# Patient Record
Sex: FEMALE | Race: WHITE | NOT HISPANIC OR LATINO | Employment: OTHER
[De-identification: names, ages, dates, MRNs, and addresses within clinical notes are randomized per-mention and may not be internally consistent; named-entity substitution may affect disease eponyms.]

---

## 2017-06-10 ENCOUNTER — HEALTH MAINTENANCE LETTER (OUTPATIENT)
Age: 32
End: 2017-06-10

## 2017-11-16 ENCOUNTER — HOSPITAL ENCOUNTER (INPATIENT)
Facility: CLINIC | Age: 32
LOS: 1 days | Discharge: HOME | End: 2017-11-18
Attending: EMERGENCY MEDICINE | Admitting: INTERNAL MEDICINE
Payer: MEDICAID

## 2017-11-16 DIAGNOSIS — T50.901A ACCIDENTAL DRUG OVERDOSE, INITIAL ENCOUNTER: ICD-10-CM

## 2017-11-16 DIAGNOSIS — F41.1 GAD (GENERALIZED ANXIETY DISORDER): Primary | ICD-10-CM

## 2017-11-16 DIAGNOSIS — M62.82 NON-TRAUMATIC RHABDOMYOLYSIS: ICD-10-CM

## 2017-11-16 DIAGNOSIS — T43.611A ACCIDENTAL CAFFEINE OVERDOSE, INITIAL ENCOUNTER (H): ICD-10-CM

## 2017-11-16 LAB
ALBUMIN SERPL-MCNC: 4.2 G/DL (ref 3.4–5)
ALP SERPL-CCNC: 69 U/L (ref 40–150)
ALT SERPL W P-5'-P-CCNC: 21 U/L (ref 0–50)
AMPHETAMINES UR QL SCN: NEGATIVE
ANION GAP SERPL CALCULATED.3IONS-SCNC: 11 MMOL/L (ref 3–14)
APAP SERPL-MCNC: <2 MG/L (ref 10–20)
AST SERPL W P-5'-P-CCNC: 23 U/L (ref 0–45)
BARBITURATES UR QL: NEGATIVE
BASOPHILS # BLD AUTO: 0 10E9/L (ref 0–0.2)
BASOPHILS NFR BLD AUTO: 0.5 %
BENZODIAZ UR QL: NEGATIVE
BILIRUB SERPL-MCNC: 0.6 MG/DL (ref 0.2–1.3)
BUN SERPL-MCNC: 8 MG/DL (ref 7–30)
CALCIUM SERPL-MCNC: 8.8 MG/DL (ref 8.5–10.1)
CANNABINOIDS UR QL SCN: NEGATIVE
CHLORIDE SERPL-SCNC: 107 MMOL/L (ref 94–109)
CK SERPL-CCNC: 2031 U/L (ref 30–225)
CO2 SERPL-SCNC: 21 MMOL/L (ref 20–32)
COCAINE UR QL: NEGATIVE
CREAT SERPL-MCNC: 0.78 MG/DL (ref 0.52–1.04)
DIFFERENTIAL METHOD BLD: ABNORMAL
EOSINOPHIL # BLD AUTO: 0 10E9/L (ref 0–0.7)
EOSINOPHIL NFR BLD AUTO: 0.2 %
ERYTHROCYTE [DISTWIDTH] IN BLOOD BY AUTOMATED COUNT: 14.6 % (ref 10–15)
ETHANOL SERPL-MCNC: <0.01 G/DL
GFR SERPL CREATININE-BSD FRML MDRD: 86 ML/MIN/1.7M2
GLUCOSE BLDC GLUCOMTR-MCNC: 100 MG/DL (ref 70–99)
GLUCOSE SERPL-MCNC: 108 MG/DL (ref 70–99)
HCG SERPL QL: NEGATIVE
HCT VFR BLD AUTO: 34.7 % (ref 35–47)
HGB BLD-MCNC: 11.7 G/DL (ref 11.7–15.7)
IMM GRANULOCYTES # BLD: 0 10E9/L (ref 0–0.4)
IMM GRANULOCYTES NFR BLD: 0.2 %
INR PPP: 0.99 (ref 0.86–1.14)
LYMPHOCYTES # BLD AUTO: 1 10E9/L (ref 0.8–5.3)
LYMPHOCYTES NFR BLD AUTO: 16.3 %
MCH RBC QN AUTO: 29.7 PG (ref 26.5–33)
MCHC RBC AUTO-ENTMCNC: 33.7 G/DL (ref 31.5–36.5)
MCV RBC AUTO: 88 FL (ref 78–100)
MONOCYTES # BLD AUTO: 0.3 10E9/L (ref 0–1.3)
MONOCYTES NFR BLD AUTO: 4.8 %
NEUTROPHILS # BLD AUTO: 4.6 10E9/L (ref 1.6–8.3)
NEUTROPHILS NFR BLD AUTO: 78 %
NRBC # BLD AUTO: 0 10*3/UL
NRBC BLD AUTO-RTO: 0 /100
OPIATES UR QL SCN: NEGATIVE
PCP UR QL SCN: NEGATIVE
PLATELET # BLD AUTO: 375 10E9/L (ref 150–450)
POTASSIUM SERPL-SCNC: 3.3 MMOL/L (ref 3.4–5.3)
PROT SERPL-MCNC: 7.3 G/DL (ref 6.8–8.8)
RBC # BLD AUTO: 3.94 10E12/L (ref 3.8–5.2)
SALICYLATES SERPL-MCNC: 3 MG/DL
SODIUM SERPL-SCNC: 139 MMOL/L (ref 133–144)
TSH SERPL DL<=0.005 MIU/L-ACNC: 2.16 MU/L (ref 0.4–4)
WBC # BLD AUTO: 5.8 10E9/L (ref 4–11)

## 2017-11-16 PROCEDURE — 25000132 ZZH RX MED GY IP 250 OP 250 PS 637: Performed by: INTERNAL MEDICINE

## 2017-11-16 PROCEDURE — 96361 HYDRATE IV INFUSION ADD-ON: CPT

## 2017-11-16 PROCEDURE — 00000146 ZZHCL STATISTIC GLUCOSE BY METER IP

## 2017-11-16 PROCEDURE — 80329 ANALGESICS NON-OPIOID 1 OR 2: CPT | Performed by: EMERGENCY MEDICINE

## 2017-11-16 PROCEDURE — G0378 HOSPITAL OBSERVATION PER HR: HCPCS

## 2017-11-16 PROCEDURE — 80320 DRUG SCREEN QUANTALCOHOLS: CPT | Performed by: EMERGENCY MEDICINE

## 2017-11-16 PROCEDURE — 93005 ELECTROCARDIOGRAM TRACING: CPT

## 2017-11-16 PROCEDURE — 84703 CHORIONIC GONADOTROPIN ASSAY: CPT | Performed by: EMERGENCY MEDICINE

## 2017-11-16 PROCEDURE — 85610 PROTHROMBIN TIME: CPT | Performed by: EMERGENCY MEDICINE

## 2017-11-16 PROCEDURE — 25000128 H RX IP 250 OP 636: Performed by: EMERGENCY MEDICINE

## 2017-11-16 PROCEDURE — 80307 DRUG TEST PRSMV CHEM ANLYZR: CPT | Performed by: EMERGENCY MEDICINE

## 2017-11-16 PROCEDURE — 85025 COMPLETE CBC W/AUTO DIFF WBC: CPT | Performed by: EMERGENCY MEDICINE

## 2017-11-16 PROCEDURE — 99220 ZZC INITIAL OBSERVATION CARE,LEVL III: CPT | Performed by: INTERNAL MEDICINE

## 2017-11-16 PROCEDURE — 82550 ASSAY OF CK (CPK): CPT | Performed by: EMERGENCY MEDICINE

## 2017-11-16 PROCEDURE — 84443 ASSAY THYROID STIM HORMONE: CPT | Performed by: EMERGENCY MEDICINE

## 2017-11-16 PROCEDURE — 80053 COMPREHEN METABOLIC PANEL: CPT | Performed by: EMERGENCY MEDICINE

## 2017-11-16 PROCEDURE — 25000128 H RX IP 250 OP 636: Performed by: INTERNAL MEDICINE

## 2017-11-16 PROCEDURE — 99285 EMERGENCY DEPT VISIT HI MDM: CPT | Mod: 25

## 2017-11-16 PROCEDURE — 96374 THER/PROPH/DIAG INJ IV PUSH: CPT

## 2017-11-16 RX ORDER — POTASSIUM CHLORIDE 1.5 G/1.58G
20-40 POWDER, FOR SOLUTION ORAL
Status: DISCONTINUED | OUTPATIENT
Start: 2017-11-16 | End: 2017-11-18 | Stop reason: HOSPADM

## 2017-11-16 RX ORDER — ONDANSETRON 2 MG/ML
4 INJECTION INTRAMUSCULAR; INTRAVENOUS EVERY 6 HOURS PRN
Status: DISCONTINUED | OUTPATIENT
Start: 2017-11-16 | End: 2017-11-18 | Stop reason: HOSPADM

## 2017-11-16 RX ORDER — LORAZEPAM 2 MG/ML
1 INJECTION INTRAMUSCULAR ONCE
Status: COMPLETED | OUTPATIENT
Start: 2017-11-16 | End: 2017-11-16

## 2017-11-16 RX ORDER — NALOXONE HYDROCHLORIDE 0.4 MG/ML
.1-.4 INJECTION, SOLUTION INTRAMUSCULAR; INTRAVENOUS; SUBCUTANEOUS
Status: DISCONTINUED | OUTPATIENT
Start: 2017-11-16 | End: 2017-11-18 | Stop reason: HOSPADM

## 2017-11-16 RX ORDER — LIDOCAINE 40 MG/G
CREAM TOPICAL
Status: DISCONTINUED | OUTPATIENT
Start: 2017-11-16 | End: 2017-11-18 | Stop reason: HOSPADM

## 2017-11-16 RX ORDER — ERGOCALCIFEROL 1.25 MG/1
50000 CAPSULE, LIQUID FILLED ORAL
COMMUNITY
End: 2018-03-02

## 2017-11-16 RX ORDER — SODIUM CHLORIDE, SODIUM LACTATE, POTASSIUM CHLORIDE, CALCIUM CHLORIDE 600; 310; 30; 20 MG/100ML; MG/100ML; MG/100ML; MG/100ML
1000 INJECTION, SOLUTION INTRAVENOUS CONTINUOUS
Status: DISCONTINUED | OUTPATIENT
Start: 2017-11-16 | End: 2017-11-16

## 2017-11-16 RX ORDER — POTASSIUM CHLORIDE 1500 MG/1
20-40 TABLET, EXTENDED RELEASE ORAL
Status: DISCONTINUED | OUTPATIENT
Start: 2017-11-16 | End: 2017-11-18 | Stop reason: HOSPADM

## 2017-11-16 RX ORDER — POTASSIUM CHLORIDE 29.8 MG/ML
20 INJECTION INTRAVENOUS
Status: DISCONTINUED | OUTPATIENT
Start: 2017-11-16 | End: 2017-11-18 | Stop reason: HOSPADM

## 2017-11-16 RX ORDER — LORAZEPAM 0.5 MG/1
.5-1 TABLET ORAL EVERY 6 HOURS PRN
Status: DISCONTINUED | OUTPATIENT
Start: 2017-11-16 | End: 2017-11-18 | Stop reason: HOSPADM

## 2017-11-16 RX ORDER — ACETAMINOPHEN 325 MG/1
650 TABLET ORAL EVERY 4 HOURS PRN
Status: DISCONTINUED | OUTPATIENT
Start: 2017-11-16 | End: 2017-11-18 | Stop reason: HOSPADM

## 2017-11-16 RX ORDER — CYANOCOBALAMIN 1000 UG/ML
1 INJECTION, SOLUTION INTRAMUSCULAR; SUBCUTANEOUS
COMMUNITY
End: 2018-03-02

## 2017-11-16 RX ORDER — POLYETHYLENE GLYCOL 3350 17 G/17G
17 POWDER, FOR SOLUTION ORAL DAILY PRN
Status: DISCONTINUED | OUTPATIENT
Start: 2017-11-16 | End: 2017-11-18 | Stop reason: HOSPADM

## 2017-11-16 RX ORDER — ONDANSETRON 4 MG/1
4 TABLET, ORALLY DISINTEGRATING ORAL EVERY 6 HOURS PRN
Status: DISCONTINUED | OUTPATIENT
Start: 2017-11-16 | End: 2017-11-18 | Stop reason: HOSPADM

## 2017-11-16 RX ORDER — BUPROPION HYDROCHLORIDE 300 MG/1
300 TABLET ORAL EVERY MORNING
Status: ON HOLD | COMMUNITY
End: 2017-11-18

## 2017-11-16 RX ORDER — ACETAMINOPHEN 650 MG/1
650 SUPPOSITORY RECTAL EVERY 4 HOURS PRN
Status: DISCONTINUED | OUTPATIENT
Start: 2017-11-16 | End: 2017-11-18 | Stop reason: HOSPADM

## 2017-11-16 RX ORDER — SODIUM CHLORIDE 9 MG/ML
1000 INJECTION, SOLUTION INTRAVENOUS CONTINUOUS
Status: DISCONTINUED | OUTPATIENT
Start: 2017-11-16 | End: 2017-11-16

## 2017-11-16 RX ORDER — SODIUM CHLORIDE 9 MG/ML
INJECTION, SOLUTION INTRAVENOUS CONTINUOUS
Status: DISCONTINUED | OUTPATIENT
Start: 2017-11-16 | End: 2017-11-18 | Stop reason: HOSPADM

## 2017-11-16 RX ORDER — POTASSIUM CL/LIDO/0.9 % NACL 10MEQ/0.1L
10 INTRAVENOUS SOLUTION, PIGGYBACK (ML) INTRAVENOUS
Status: DISCONTINUED | OUTPATIENT
Start: 2017-11-16 | End: 2017-11-18 | Stop reason: HOSPADM

## 2017-11-16 RX ORDER — NITROGLYCERIN 0.4 MG/1
0.4 TABLET SUBLINGUAL EVERY 5 MIN PRN
Status: DISCONTINUED | OUTPATIENT
Start: 2017-11-16 | End: 2017-11-18 | Stop reason: HOSPADM

## 2017-11-16 RX ORDER — POTASSIUM CHLORIDE 7.45 MG/ML
10 INJECTION INTRAVENOUS
Status: DISCONTINUED | OUTPATIENT
Start: 2017-11-16 | End: 2017-11-18 | Stop reason: HOSPADM

## 2017-11-16 RX ORDER — LISINOPRIL 10 MG/1
10 TABLET ORAL DAILY
COMMUNITY
End: 2018-03-02

## 2017-11-16 RX ADMIN — POTASSIUM CHLORIDE 20 MEQ: 1500 TABLET, EXTENDED RELEASE ORAL at 21:17

## 2017-11-16 RX ADMIN — SODIUM CHLORIDE: 9 INJECTION, SOLUTION INTRAVENOUS at 18:56

## 2017-11-16 RX ADMIN — SODIUM CHLORIDE, POTASSIUM CHLORIDE, SODIUM LACTATE AND CALCIUM CHLORIDE 1000 ML: 600; 310; 30; 20 INJECTION, SOLUTION INTRAVENOUS at 15:55

## 2017-11-16 RX ADMIN — LORAZEPAM 1 MG: 2 INJECTION INTRAMUSCULAR; INTRAVENOUS at 15:10

## 2017-11-16 RX ADMIN — LORAZEPAM 1 MG: 0.5 TABLET ORAL at 19:51

## 2017-11-16 RX ADMIN — POTASSIUM CHLORIDE 40 MEQ: 1500 TABLET, EXTENDED RELEASE ORAL at 19:25

## 2017-11-16 RX ADMIN — SODIUM CHLORIDE, POTASSIUM CHLORIDE, SODIUM LACTATE AND CALCIUM CHLORIDE 1000 ML: 600; 310; 30; 20 INJECTION, SOLUTION INTRAVENOUS at 17:22

## 2017-11-16 RX ADMIN — SODIUM CHLORIDE 1000 ML: 9 INJECTION, SOLUTION INTRAVENOUS at 15:09

## 2017-11-16 ASSESSMENT — ENCOUNTER SYMPTOMS
CHEST TIGHTNESS: 1
NERVOUS/ANXIOUS: 1

## 2017-11-16 NOTE — ED NOTES
Assisted with Pt. Ambulance triage. Applied monitoring devices (EKG, BP, and pulse ox) onto Patient.

## 2017-11-16 NOTE — IP AVS SNAPSHOT
MRN:[id number]                      After Visit Summary   11/16/2017    Bhavya Estrada    MRN: [id number]           Thank you!     Thank you for choosing [facility] for your care. Our goal is always to provide you with excellent care. Hearing back from our patients is one way we can continue to improve our services. Please take a few minutes to complete the written survey that you may receive in the mail after you visit. If you would like to speak to someone directly about your visit please contact Patient Relations at [phone]. Thank you!          Patient Information     Date Of Birth          1985        Designated Caregiver       Most Recent Value    Caregiver    Will someone help with your care after discharge? yes    Name of designated caregiver Silvio Atkinson    Phone number of caregiver see chart     Caregiver address see chart       About your hospital stay     You were admitted on:  November 16, 2017 You last received care in the:  [address] [facility]    You were discharged on:  November 18, 2017        Reason for your hospital stay       You had developed significant side effects from excessive dosing of Bupropion (Wellbutrin).   I have given you a new antidepressant/antianxiety medication (Venlafaxine, Effexor) that you may try. Start with one pill in the morning and then increase to 1 twice a day with meals. Follow up with your doctor in about two weeks to discuss the new medication.                  Who to Call     For medical emergencies, please call [phone].  For non-urgent questions about your medical care, please call your primary care provider or clinic, [phone]          Attending Provider     Provider Specialty    Fernando Guevara MD Emergency Medicine    [facility]    Rasheed Christian MD Internal Medicine       Primary Care Provider Office Phone # Fax #    [facility] [phone]  "     After Care Instructions     Activity       Your activity upon discharge: activity as tolerated            Diet       Follow this diet upon discharge: Regular                  Follow-up Appointments     Follow-up and recommended labs and tests        Follow up with primary care provider, Kojo Carvajal, within 2 weeks for medication follow up.                  Pending Results     No orders found from 2017 to 2017.            Statement of Approval     Ordered          17 1355  I have reviewed and agree with all the recommendations and orders detailed in this document.  EFFECTIVE NOW     Approved and electronically signed by:  Rasheed Christian MD             Admission Information     Date & Time Provider Department Dept. Phone    2017 Rasheed Christian MD [place] [address] [facility] [phone]      Your Vitals Were     Blood Pressure Pulse Temperature Respirations Height Weight    126/96 (BP Location: Left arm) 106 98.1  F (36.7  C) (Oral) 16 1.651 m (5' 5\") 67.1 kg (148 lb)    Pulse Oximetry BMI (Body Mass Index)                100% 24.63 kg/m2          MyChart Information     resmio lets you send messages to your doctor, view your test results, renew your prescriptions, schedule appointments and more. To sign up, go to [email or web address] . Click on \"Log in\" on the left side of the screen, which will take you to the Welcome page. Then click on \"Sign up Now\" on the right side of the page.     You will be asked to enter the access code listed below, as well as some personal information. Please follow the directions to create your username and password.     Your access code is: [email or web address]  Expires: 2018  2:01 PM     Your access code will  in 90 days. If you need help or a new code, please call your [place] clinic or [phone].        Care EveryWhere ID     This is your Care EveryWhere ID. This could be used by other organizations to access your [place] " medical records  [id number]        Equal Access to Services     ANDREW VINCENT : Hadii aad ku hademigdiowilfrido Sochristianeali, waaxda luqadaha, qaybta kawilliamorin lujan, danielle tolliverchristophercarol padilla. So [facility] [phone].    ATENCIÓN: Si habla español, tiene a kitchen disposición servicios gratuitos de asistencia lingüística. Llame al [phone].    We comply with applicable federal civil rights laws and [place] laws. We do not discriminate on the basis of race, color, national origin, age, disability, sex, sexual orientation, or gender identity.               Review of your medicines      START taking        Dose / Directions    venlafaxine 37.5 MG tablet   Commonly known as:  EFFEXOR        Take 1 po daily x 4 days, then increase to 1 twice daily.   Quantity:  60 tablet   Refills:  1         CONTINUE these medicines which have NOT CHANGED        Dose / Directions    caffeine 200 MG Tabs tablet   Commonly known as:  NO-DOZE        Dose:  400-600 mg   Take 400-600 mg by mouth every morning Per pt, takes 2 to 3 tabs every morning   Refills:  0       cyanocobalamin 1000 MCG/ML injection   Commonly known as:  VITAMIN B12        Dose:  1 mL   Inject 1 mL into the muscle every 30 days   Refills:  0       lisinopril 10 MG tablet   Commonly known as:  PRINIVIL/ZESTRIL        Dose:  10 mg   Take 10 mg by mouth daily   Refills:  0       vitamin D 27946 UNIT capsule   Commonly known as:  ERGOCALCIFEROL        Dose:  26478 Units   Take 50,000 Units by mouth twice a week   Refills:  0         STOP taking     buPROPion 300 MG 24 hr tablet   Commonly known as:  WELLBUTRIN XL                Where to get your medicines      These medications were sent to [facility] [place], [place] - [address] [facility]  [address] [facility] [address]     Phone:  [phone]     venlafaxine 37.5 MG tablet                Protect others around you: Learn how to safely use, store and throw away your medicines at  www.disposemymeds.org.             Medication List: This is a list of all your medications and when to take them. Check marks below indicate your daily home schedule. Keep this list as a reference.      Medications           Morning Afternoon Evening Bedtime As Needed    caffeine 200 MG Tabs tablet   Commonly known as:  NO-DOZE   Take 400-600 mg by mouth every morning Per pt, takes 2 to 3 tabs every morning                                   cyanocobalamin 1000 MCG/ML injection   Commonly known as:  VITAMIN B12   Inject 1 mL into the muscle every 30 days                                lisinopril 10 MG tablet   Commonly known as:  PRINIVIL/ZESTRIL   Take 10 mg by mouth daily                                   venlafaxine 37.5 MG tablet   Commonly known as:  EFFEXOR   Take 1 po daily x 4 days, then increase to 1 twice daily.            One tablet Sunday, Monday, Tuesday, and Wednesday. Starting Thursday take one tablet twice a day.           Start at breakfast and supper on Wednesday.               vitamin D 98225 UNIT capsule   Commonly known as:  ERGOCALCIFEROL   Take 50,000 Units by mouth twice a week            Twice a week.                                 More Information        Patient Education    Venlafaxine Hydrochloride Oral capsule, extended-release    Venlafaxine Hydrochloride Oral tablet    Venlafaxine Hydrochloride Oral tablet, extended-release  Venlafaxine Hydrochloride Oral tablet  What is this medicine?  VENLAFAXINE (KARTIK la fax een) is used to treat depression, anxiety and panic disorder.  This medicine may be used for other purposes; ask your health care provider or pharmacist if you have questions.  What should I tell my health care provider before I take this medicine?  They need to know if you have any of these conditions:    bleeding disorders    glaucoma    heart disease    high blood pressure    high cholesterol    kidney disease    liver disease    low levels of sodium in the blood    whitney or  bipolar disorder    seizures    suicidal thoughts, plans, or attempt; a previous suicide attempt by you or a family    take medicines that treat or prevent blood clots    thyroid disease    an unusual or allergic reaction to venlafaxine, desvenlafaxine, other medicines, foods, dyes, or preservatives    pregnant or trying to get pregnant    breast-feeding  How should I use this medicine?  Take this medicine by mouth with a glass of water. Follow the directions on the prescription label. Take it with food. Take your medicine at regular intervals. Do not take your medicine more often than directed. Do not stop taking this medicine suddenly except upon the advice of your doctor. Stopping this medicine too quickly may cause serious side effects or your condition may worsen.  A special MedGuide will be given to you by the pharmacist with each prescription and refill. Be sure to read this information carefully each time.  Talk to your pediatrician regarding the use of this medicine in children. Special care may be needed.  Overdosage: If you think you have taken too much of this medicine contact a poison control center or emergency room at once.  NOTE: This medicine is only for you. Do not share this medicine with others.  What if I miss a dose?  If you miss a dose, take it as soon as you can. If it is almost time for your next dose, take only that dose. Do not take double or extra doses.  What may interact with this medicine?  Do not take this medicine with any of the following medications:    certain medicines for fungal infections like fluconazole, itraconazole, ketoconazole, posaconazole, voriconazole    cisapride    desvenlafaxine    dofetilide    dronedarone    duloxetine    levomilnacipran    linezolid    MAOIs like Carbex, Eldepryl, Marplan, Nardil, and Parnate    methylene blue (injected into a vein)    milnacipran    pimozide    thioridazine    ziprasidone  This medicine may also interact with the following  medications:    aspirin and aspirin-like medicines    certain medicines for depression, anxiety, or psychotic disturbances    certain medicines for migraine headaches like almotriptan, eletriptan, frovatriptan, naratriptan, rizatriptan, sumatriptan, zolmitriptan    certain medicines for sleep    certain medicines that treat or prevent blood clots like dalteparin, enoxaparin, warfarin    cimetidine    clozapine    diuretics    fentanyl    furazolidone    indinavir    isoniazid    lithium    metoprolol    NSAIDS, medicines for pain and inflammation, like ibuprofen or naproxen    other medicines that prolong the QT interval (cause an abnormal heart rhythm)    procarbazine    rasagiline    supplements like Lasker's wort, kava kava, valerian    tramadol    tryptophan  This list may not describe all possible interactions. Give your health care provider a list of all the medicines, herbs, non-prescription drugs, or dietary supplements you use. Also tell them if you smoke, drink alcohol, or use illegal drugs. Some items may interact with your medicine.  What should I watch for while using this medicine?  Tell your doctor if your symptoms do not get better or if they get worse. Visit your doctor or health care professional for regular checks on your progress. Because it may take several weeks to see the full effects of this medicine, it is important to continue your treatment as prescribed by your doctor.  Patients and their families should watch out for new or worsening thoughts of suicide or depression. Also watch out for sudden changes in feelings such as feeling anxious, agitated, panicky, irritable, hostile, aggressive, impulsive, severely restless, overly excited and hyperactive, or not being able to sleep. If this happens, especially at the beginning of treatment or after a change in dose, call your health care professional.  This medicine can cause an increase in blood pressure. Check with your doctor for  instructions on monitoring your blood pressure while taking this medicine.  You may get drowsy or dizzy. Do not drive, use machinery, or do anything that needs mental alertness until you know how this medicine affects you. Do not stand or sit up quickly, especially if you are an older patient. This reduces the risk of dizzy or fainting spells. Alcohol may interfere with the effect of this medicine. Avoid alcoholic drinks.  Your mouth may get dry. Chewing sugarless gum, sucking hard candy and drinking plenty of water will help. Contact your doctor if the problem does not go away or is severe.  What side effects may I notice from receiving this medicine?  Side effects that you should report to your doctor or health care professional as soon as possible:    allergic reactions like skin rash, itching or hives, swelling of the face, lips, or tongue    breathing problems    changes in vision    seizures    suicidal thoughts or other mood changes    trouble passing urine or change in the amount of urine    unusual bleeding or bruising  Side effects that usually do not require medical attention (report to your doctor or health care professional if they continue or are bothersome):    change in sex drive or performance    constipation    increased sweating    loss of appetite    nausea    tremors    weight loss  This list may not describe all possible side effects. Call your doctor for medical advice about side effects. You may report side effects to FDA at 3-329-FDA-5244.  Where should I keep my medicine?  Keep out of the reach of children.  Store at a controlled temperature between 20 and 25 degrees C (68 and 77 degrees F), in a dry place. Throw away any unused medicine after the expiration date.  NOTE:This sheet is a summary. It may not cover all possible information. If you have questions about this medicine, talk to your doctor, pharmacist, or health care provider. Copyright  2016 Gold Standard

## 2017-11-16 NOTE — ED NOTES
Bed: ED04  Expected date: 11/16/17  Expected time: 2:12 PM  Means of arrival: Ambulance  Comments:  Kojo Hargrove

## 2017-11-16 NOTE — ED NOTES
Patient ambulated to bathroom with minimal assistance. Gait steady, no complaints of dizziness, or being light headed.

## 2017-11-16 NOTE — ED NOTES
"[facility]  ED Nurse Handoff Report    Bhavya Estrada is a 32 year old female   ED Chief complaint: No chief complaint on file.  . ED Diagnosis:   Final diagnoses:   Accidental drug overdose, initial encounter   Accidental caffeine overdose, initial encounter   Non-traumatic rhabdomyolysis     Allergies: No Known Allergies    Code Status: Not on file  Activity level - Baseline/Home:  Independent. Activity Level - Current:   Stand with Assist. Lift room needed: No. Bariatric: No   Needed: No   Isolation: No. Infection: Not Applicable.     Vital Signs:   Vitals:    11/16/17 1615 11/16/17 1630 11/16/17 1700 11/16/17 1715   BP: 127/90 123/89 (!) 130/94 (!) 128/94   Pulse:       Resp: 13 15 13 12   Temp:       TempSrc:       SpO2: 97% 100% 100% 99%   Weight:           Cardiac Rhythm:  ,      Pain level: 0-10 Pain Scale: 0  Patient confused: No. Patient Falls Risk: Yes.   Elimination Status: Has voided   Patient Report - Initial Complaint: Overdose . Focused Assessment: Bhavya Estrada is a 32 year old female with a history of hypertension and anxiety, who presents to the emergency department today for evaluation of medication overdose. The patient states that she took 2 doses of her Wellbutrin XL (300 mg tablets) as well as 2 caffeine pills (200 mg tablets) today. She has been trying to self medicate for her anxiety of late by taking more doses of her Wellbutrin than she is prescribed. Her prescription was last filled on 11/10/17 and there are 17 tablets missing instead of what should only be 7 tablets missing. She is anxious here and describes something, like a \"numbness\", going up from her chest into her head and breathing deeply prevents \"it\" from doing so. She thinks she is going to pass out, but has not done so. She denies illicit drug use or alcohol use. Denies co-ingestion.  Tests Performed: Blood work  Abnormal Results:   Labs Ordered and Resulted from Time of ED Arrival Up to the Time " of Departure from the ED   CBC WITH PLATELETS DIFFERENTIAL - Abnormal; Notable for the following:        Result Value    Hematocrit 34.7 (*)     All other components within normal limits   COMPREHENSIVE METABOLIC PANEL - Abnormal; Notable for the following:     Potassium 3.3 (*)     Glucose 108 (*)     All other components within normal limits   CK TOTAL - Abnormal; Notable for the following:     CK Total 2031 (*)     All other components within normal limits   GLUCOSE BY METER - Abnormal; Notable for the following:     Glucose 100 (*)     All other components within normal limits   INR   SALICYLATE LEVEL   ACETAMINOPHEN LEVEL   ALCOHOL ETHYL   TSH   DRUG ABUSE SCREEN 77 URINE (FL, RH, SH)   HCG QUALITATIVE   GLUCOSE MONITOR NURSING POCT   PULSE OXIMETRY NURSING   CARDIAC CONTINUOUS MONITORING   PERIPHERAL IV CATHETER     Treatments provided: Fluids, Ativan   Family Comments: Significant Other in Room   OBS brochure/video discussed/provided to patient:  N/A  ED Medications:   Medications   0.9% sodium chloride BOLUS (0 mLs Intravenous Stopped 11/16/17 1716)     Followed by   0.9% sodium chloride infusion (not administered)   lactated ringers BOLUS 1,000 mL (1,000 mLs Intravenous New Bag 11/16/17 1722)   lactated ringers infusion (not administered)   LORazepam (ATIVAN) injection 1 mg (1 mg Intravenous Given 11/16/17 1510)   lactated ringers BOLUS 1,000 mL (0 mLs Intravenous Stopped 11/16/17 1716)     Drips infusing:  Yes  For the majority of the shift, the patient's behavior Green. Interventions performed were NA.     Severe Sepsis OR Septic Shock Diagnosis Present: No      ED Nurse Name/Phone Number: Luisa Vicki,   5:25 PM  RECEIVING UNIT ED HANDOFF REVIEW    Above ED Nurse Handoff Report was reviewed: Yes  Reviewed by: Noah Lee on November 16, 2017 at 6:07 PM

## 2017-11-16 NOTE — ED NOTES
"Pt much calmer since ativan.  Eyes open HR now 112.  States she knows it is \"just a way of feeling\" and that \"I watch too many Dr shows.\"  "

## 2017-11-16 NOTE — IP AVS SNAPSHOT
[place] [address] [facility]    [address]    [facility] [address]    Phone:  [phone]    Fax:  [phone]                                       After Visit Summary   11/16/2017    Bhavya Estrada    MRN: [id number]           After Visit Summary Signature Page     I have received my discharge instructions, and my questions have been answered. I have discussed any challenges I see with this plan with the nurse or doctor.    ..........................................................................................................................................  Patient/Patient Representative Signature      ..........................................................................................................................................  Patient Representative Print Name and Relationship to Patient    ..................................................               ................................................  Date                                            Time    ..........................................................................................................................................  Reviewed by Signature/Title    ...................................................              ..............................................  Date                                                            Time

## 2017-11-16 NOTE — ED PROVIDER NOTES
"  History     Chief Complaint:  Anxiety; Medication Overdose    HPI  History is limited secondary to agitation and anxiety  Bhavya Estrada is a 32 year old female with a history of hypertension and anxiety, who presents to the emergency department today for evaluation of medication overdose. The patient states that she took 2 doses of her Wellbutrin XL (300 mg tablets) as well as 2 caffeine pills (200 mg tablets) today. She has been trying to self medicate for her anxiety of late by taking more doses of her Wellbutrin than she is prescribed. Her prescription was last filled on 11/10/17 and there are 17 tablets missing instead of what should only be 7 tablets missing. She is anxious here and describes something, like a \"numbness\", going up from her chest into her head and breathing deeply prevents \"it\" from doing so. She thinks she is going to pass out, but has not done so. She denies illicit drug use or alcohol use. Denies co-ingestion.    Allergies:  No Known Drug Allergies      Medications:      BuPROPion HCl (WELLBUTRIN PO)    Past Medical History:    Anxiety   Depression     Past Surgical History:    History reviewed. No pertinent past surgical history.     Family History:    History reviewed. No pertinent family history.      Social History:  The patient was accompanied to the ED by EMS.  Smoking Status: unknown     Review of Systems   Respiratory: Positive for chest tightness.    Neurological: Negative for syncope.   Psychiatric/Behavioral: The patient is nervous/anxious.    All other systems reviewed and are negative.    Physical Exam     Patient Vitals for the past 24 hrs:   BP Temp Temp src Pulse Heart Rate Resp SpO2 Weight   11/16/17 1700 - - - - 105 13 100 % -   11/16/17 1600 127/86 - - - 105 - 100 % -   11/16/17 1545 (!) 133/95 - - - 113 - 99 % -   11/16/17 1515 (!) 139/101 - - 106 106 12 90 % -   11/16/17 1500 (!) 149/109 - - 125 125 20 100 % -   11/16/17 1445 - - - - - - 100 % -   11/16/17 1442 (!) " 173/99 98.7  F (37.1  C) Oral 137 - 22 100 % 68 kg (150 lb)      Physical Exam    General:   Age appropriate, agitated and ill appearing female.   HEENT:    Oropharynx is moist, without lesions or trismus.  Eyes:    Conjunctiva normal, PERRL     No nystagmus  Neck:    Supple, no meningismus.     CV:     Tachycardic, regular rhythm.      No murmurs, rubs or gallops.        2+ radial pulses bilateral.   PULM:    Clear to auscultation bilateral.       No respiratory distress.      Good air exchange.  ABD:    Soft, non-tender, non-distended.       No rebound, guarding or rigidity.  MSK:     No gross deformity to all four extremities.   LYMPH:   No cervical lymphadenopathy.  NEURO:   Alert;oriented x 3.     Intermittently following commands.      Unable to comply with CN testing     Speech is rapid and tangential     No clonus     2+ bilateral patellar reflexes     Down-going Babinski bilateral     Strength is grossly symmetric     Normal muscular tone, no tremor.     No rigidity  Skin:    Warm, dry and intact.    Psych:    Moderate agitation     Severely anxious     Hyperventilating    Emergency Department Course     ECG:  ECG taken at 1547, ECG read at 1555  Sinus tachycardia  Possible left atrial enlargement   Borderline ECG   Rate 106 bpm. TX interval 156. QRS duration 94. QT/QTc 368/488. P-R-T axes 54,47,53.     Laboratory:  Laboratory findings were communicated with the patient who voiced understanding of the findings.  Drug abuse screen 77 urine: all negative   CBC: WBC 5.8, HGB 11.7,   CMP: K 3.3 (L), Glucose 108 (H), o/w WNL (Creatinine 0.78)  INR: 0.99  Salicylate level: 3  Acetaminophen level: <2   Ethanol level: <0.01  TSH: 2.16  HCG Qualitative Blood: negative   CK Total: 2031 (HH)  Glucose: 100 (H)    Interventions:  1509 NS, 1 L, IV   1510 Lorazepam, 1 mg, IV injection  1555 LR 1 L IV  1722 LR 1,000 mL IV    Emergency Department Course:  Nursing notes and vitals reviewed.  1447 I entered the  room.  1450 I performed an exam of the patient as documented above.   IV was inserted and blood was drawn for laboratory testing, results above.  The patient provided a urine sample here in the emergency department. This was sent for laboratory testing, findings above.   EKG obtained in the ED, see results above.    The patient received the above intervention(s).    1643 I spoke with poison control regarding patient's presentation, findings, and plan of care.   1710 the patient was rechecked and she was updated on the results of her laboratory studies.  Patient alert & oriented x 3.  No agitation, confusion, clonus or hyper-reflexia.  1719 I spoke with Dr. Cowan of the hospitalist service regarding patient's presentation, findings, and plan of care.   I discussed the treatment plan with the patient. They expressed understanding of this plan and consented to admission. I discussed the patient with Dr. Cowan, who will admit the patient to a monitored bed for further evaluation and treatment.     Impression & Plan      Medical Decision Making:  Bhavya Estrada is a 32 year old female who presents to the emergency department today for evaluation of unintentional overdose of Wellbutrin taking up to 900 mg per day with 400-600 mg of caffeine. On presentation she is agitated, tangential, and anxious. There was no gross alteration in mentation. The initial concern was for serotonin syndrome, but there was no clonus, hyper-reflexia or hyperthermia. She had remarkable improvement with 1 dose of Lorazepam for which she is now back to baseline. She is no longer tachycardic, hypertensive, agitated, or anxious. CK is mildly elevated which is of undetermined significance, though it maybe related to increased muscular activity related to the stimulant use of Wellbutrin and caffeine. No reported history of seizure. It is unlikely this represents serotonin syndrome given her near complete resolution of her symptoms in the ED. I  spoke with poison control, who recommended 24 hour observation to evaluate for seizure. The patient will be transferred to a monitored bed for further evaluation and treatment.     Diagnosis:    ICD-10-CM    1. Accidental drug overdose, initial encounter T50.901A    2. Accidental caffeine overdose, initial encounter T43.611A    3. Non-traumatic rhabdomyolysis M62.82      Disposition:   The patient was admitted to the hospital for further evaluation and care.     CMS Diagnoses: None     Scribe Disclosure:  I, Chi Bryan, am serving as a scribe at 2:47 PM on 11/16/2017 to document services personally performed by Fernando Guevara MD, based on my observations and the provider's statements to me.   [facility] EMERGENCY DEPARTMENT       Fernando Guevara MD  11/16/17 8299

## 2017-11-16 NOTE — ED NOTES
States 2-8 200mg caffeine pills and 2 doses of wellbutrin today.  Has taken 17 out of 30 pills of her Wellbutrin over  The past 6 days self treating for anxiety and depression.  Pt is extremely anxious breathing rapidly.  States that if she stops concentrating on deep breathing she will die.  States that she is losing the ability to talk.  Pt remains alert and oriented with clear speech and no difficulty breathing, swallowing or handling secretions.

## 2017-11-17 PROBLEM — T43.291A: Status: ACTIVE | Noted: 2017-11-17

## 2017-11-17 LAB
ALBUMIN SERPL-MCNC: 3 G/DL (ref 3.4–5)
ALP SERPL-CCNC: 55 U/L (ref 40–150)
ALT SERPL W P-5'-P-CCNC: 23 U/L (ref 0–50)
ANION GAP SERPL CALCULATED.3IONS-SCNC: 5 MMOL/L (ref 3–14)
AST SERPL W P-5'-P-CCNC: 29 U/L (ref 0–45)
BILIRUB DIRECT SERPL-MCNC: 0.1 MG/DL (ref 0–0.2)
BILIRUB SERPL-MCNC: 0.5 MG/DL (ref 0.2–1.3)
BUN SERPL-MCNC: 4 MG/DL (ref 7–30)
CALCIUM SERPL-MCNC: 7.2 MG/DL (ref 8.5–10.1)
CHLORIDE SERPL-SCNC: 111 MMOL/L (ref 94–109)
CK SERPL-CCNC: 3128 U/L (ref 30–225)
CK SERPL-CCNC: 3347 U/L (ref 30–225)
CO2 SERPL-SCNC: 24 MMOL/L (ref 20–32)
CREAT SERPL-MCNC: 0.66 MG/DL (ref 0.52–1.04)
GFR SERPL CREATININE-BSD FRML MDRD: >90 ML/MIN/1.7M2
GLUCOSE SERPL-MCNC: 80 MG/DL (ref 70–99)
INTERPRETATION ECG - MUSE: NORMAL
POTASSIUM SERPL-SCNC: 3.9 MMOL/L (ref 3.4–5.3)
POTASSIUM SERPL-SCNC: 4.2 MMOL/L (ref 3.4–5.3)
PROT SERPL-MCNC: 5.5 G/DL (ref 6.8–8.8)
SODIUM SERPL-SCNC: 140 MMOL/L (ref 133–144)

## 2017-11-17 PROCEDURE — 25000128 H RX IP 250 OP 636: Performed by: INTERNAL MEDICINE

## 2017-11-17 PROCEDURE — 25000125 ZZHC RX 250: Performed by: INTERNAL MEDICINE

## 2017-11-17 PROCEDURE — 80048 BASIC METABOLIC PNL TOTAL CA: CPT | Performed by: INTERNAL MEDICINE

## 2017-11-17 PROCEDURE — 80076 HEPATIC FUNCTION PANEL: CPT | Performed by: INTERNAL MEDICINE

## 2017-11-17 PROCEDURE — 84132 ASSAY OF SERUM POTASSIUM: CPT | Performed by: INTERNAL MEDICINE

## 2017-11-17 PROCEDURE — 25000132 ZZH RX MED GY IP 250 OP 250 PS 637: Performed by: INTERNAL MEDICINE

## 2017-11-17 PROCEDURE — 82550 ASSAY OF CK (CPK): CPT | Performed by: INTERNAL MEDICINE

## 2017-11-17 PROCEDURE — 25000132 ZZH RX MED GY IP 250 OP 250 PS 637: Performed by: PSYCHIATRY & NEUROLOGY

## 2017-11-17 PROCEDURE — G0378 HOSPITAL OBSERVATION PER HR: HCPCS

## 2017-11-17 PROCEDURE — 99233 SBSQ HOSP IP/OBS HIGH 50: CPT | Performed by: INTERNAL MEDICINE

## 2017-11-17 PROCEDURE — 12000007 ZZH R&B INTERMEDIATE

## 2017-11-17 PROCEDURE — 36415 COLL VENOUS BLD VENIPUNCTURE: CPT | Performed by: INTERNAL MEDICINE

## 2017-11-17 PROCEDURE — 25000132 ZZH RX MED GY IP 250 OP 250 PS 637: Performed by: HOSPITALIST

## 2017-11-17 RX ORDER — HYDROXYZINE HYDROCHLORIDE 25 MG/1
25 TABLET, FILM COATED ORAL 3 TIMES DAILY PRN
Status: DISCONTINUED | OUTPATIENT
Start: 2017-11-17 | End: 2017-11-17

## 2017-11-17 RX ORDER — DIAZEPAM 5 MG
5 TABLET ORAL EVERY 6 HOURS PRN
Status: DISCONTINUED | OUTPATIENT
Start: 2017-11-17 | End: 2017-11-18 | Stop reason: HOSPADM

## 2017-11-17 RX ORDER — CAFFEINE 200 MG
400-600 TABLET ORAL EVERY MORNING
COMMUNITY

## 2017-11-17 RX ORDER — LORAZEPAM 2 MG/ML
0.5 INJECTION INTRAMUSCULAR EVERY 4 HOURS PRN
Status: DISCONTINUED | OUTPATIENT
Start: 2017-11-17 | End: 2017-11-18 | Stop reason: HOSPADM

## 2017-11-17 RX ORDER — PROMETHAZINE HYDROCHLORIDE 25 MG/ML
25 INJECTION, SOLUTION INTRAMUSCULAR; INTRAVENOUS EVERY 6 HOURS PRN
Status: DISCONTINUED | OUTPATIENT
Start: 2017-11-17 | End: 2017-11-18 | Stop reason: HOSPADM

## 2017-11-17 RX ORDER — MIRTAZAPINE 7.5 MG/1
7.5-15 TABLET, FILM COATED ORAL AT BEDTIME
Status: DISCONTINUED | OUTPATIENT
Start: 2017-11-17 | End: 2017-11-18 | Stop reason: HOSPADM

## 2017-11-17 RX ADMIN — LORAZEPAM 1 MG: 0.5 TABLET ORAL at 16:38

## 2017-11-17 RX ADMIN — SODIUM CHLORIDE: 9 INJECTION, SOLUTION INTRAVENOUS at 13:41

## 2017-11-17 RX ADMIN — SODIUM CHLORIDE 1000 ML: 9 INJECTION, SOLUTION INTRAVENOUS at 08:37

## 2017-11-17 RX ADMIN — SODIUM CHLORIDE: 9 INJECTION, SOLUTION INTRAVENOUS at 18:45

## 2017-11-17 RX ADMIN — MIRTAZAPINE 7.5 MG: 7.5 TABLET ORAL at 21:29

## 2017-11-17 RX ADMIN — LORAZEPAM 1 MG: 0.5 TABLET ORAL at 23:55

## 2017-11-17 RX ADMIN — LORAZEPAM 1 MG: 0.5 TABLET ORAL at 08:34

## 2017-11-17 RX ADMIN — ACETAMINOPHEN 650 MG: 325 TABLET, FILM COATED ORAL at 13:59

## 2017-11-17 RX ADMIN — SODIUM CHLORIDE: 9 INJECTION, SOLUTION INTRAVENOUS at 23:53

## 2017-11-17 RX ADMIN — DIAZEPAM 5 MG: 5 TABLET ORAL at 19:32

## 2017-11-17 RX ADMIN — ONDANSETRON 4 MG: 4 TABLET, ORALLY DISINTEGRATING ORAL at 13:59

## 2017-11-17 RX ADMIN — HYDROXYZINE HYDROCHLORIDE 25 MG: 25 TABLET, FILM COATED ORAL at 13:05

## 2017-11-17 RX ADMIN — ACETAMINOPHEN 650 MG: 325 TABLET, FILM COATED ORAL at 23:55

## 2017-11-17 RX ADMIN — LORAZEPAM 1 MG: 0.5 TABLET ORAL at 00:47

## 2017-11-17 RX ADMIN — SODIUM CHLORIDE: 9 INJECTION, SOLUTION INTRAVENOUS at 02:28

## 2017-11-17 ASSESSMENT — ACTIVITIES OF DAILY LIVING (ADL)
ADLS_ACUITY_SCORE: 12
ADLS_ACUITY_SCORE: 12

## 2017-11-17 NOTE — H&P
CHIEF COMPLAINT:  Accidental overdose.      HISTORY OF PRESENT ILLNESS:  Ms. Bhavya Estrada is a 32-year-old female with a history of depression and anxiety.  She presented to the hospital today for concerns about accidental overdose.  She states that she has been taking Wellbutrin for quite some time.  Her normal dose had been 300 mg a day; however, she decided to increase this dose of 600 mg per day several months ago for treatment of her anxiety.  She had run out of health insurance, and therefore was not seeing her primary provider.  She felt she needed extra assistance with treatment of her anxiety; therefore, decided to double the dose.  About 1 week ago, she refilled her Wellbutrin and decided at that time to increase the dose to 3 tablets a day or 900 mg daily.  She had been tolerating this dose.  However, today she reports that she accidentally took double this amount, or a total of 1800 mg today.  She also took several caffeine tablets, which she normally takes every day.  She adamantly denies any suicidal intent here.  She states that she just forgot she had taken her medications.  After she took the double dose, she began to feel some shortness of breath and tremors.  She also became more confused.  This prompted her appearance in the Emergency Department.      On arrival to the ER, vital signs included a blood pressure of 175/100 with a heart rate of 137, afebrile, saturation 100% on room air.  According to ER provider, the patient appeared quite anxious and was hyperventilating.  She seemed somewhat agitated.  Per ER provider, after she received a dose of Ativan, symptoms were much improved.      Provider called Poison Control who recommended that the patient be admitted to [facility] observation overnight.  There was initial concern about the possibility of serotonin syndrome, but given the patient's lack of rigidity and fevers and improvement with a single dose of Ativan, seems unlikely diagnosis here.       EKG was also done in the ER, which shows normal sinus rhythm with unremarkable QT interval.      PAST MEDICAL HISTORY:   1.  Depression.   2.  Anxiety.   3.  Anemia.   4.  Vitamin D deficiency.   5.  Gastric bypass surgery history.   6.  Polycystic ovarian syndrome.   7.  Gestational diabetes mellitus.      CURRENT MEDICATIONS:   1.  Wellbutrin as above.  As mentioned above, she has been increasing her dose without supervision from a primary provider.     2.  She also takes caffeine tablets every day.      ALLERGIES:  She was told not to take NSAID medications in light of her gastric bypass history.      FAMILY HISTORY:  Reviewed.  Nothing contributory to this admission.      SOCIAL HISTORY:  The patient has 2 small children at home.  She has had some increased social stressors of late including relationship stress which prompted her recent increase of Wellbutrin.  She drinks socially.  She is a smoker, smoking up to 4 cigarettes per day.  Denies any illicit drug use.      REVIEW OF SYSTEMS:  Please see HPI for details.  A comprehensive greater than 10-point review of systems otherwise negative aside from that detailed above.      PHYSICAL EXAMINATION:   VITAL SIGNS:  Blood pressure is currently 125/85 with a heart rate of 100, afebrile, saturation 99% on room air.   GENERAL:  The patient appears nontoxic, in no acute distress.  She appears awake, alert and oriented x3, perhaps slightly anxious, but not markedly so.  Significant other is at bedside.   HEENT:  Head is atraumatic.  Sclerae white.  Eyelids normal.  Conjunctivae normal.  Extraocular movements are intact.   NECK:  Supple.  No cervical or supraclavicular lymphadenopathy.   HEART:  Mildly tachycardic.  Rhythm is regular.  No significant murmurs.  No lower extremity edema.   LUNGS:  Clear to auscultation bilaterally.  No intercostal retractions.  No conversational dyspnea.   ABDOMEN:  Nontender, nondistended.  Soft.  No masses.  No organomegaly.  "  EXTREMITIES:  No edema.   SKIN:  Exam reveals no rash.  No jaundice.  Skin is dry to touch.   NEUROLOGIC:  Cranial nerves II-XII are intact.  Moves all extremities appropriately.  Sensation intact to light touch in the upper and lower extremities bilaterally.  The patient does not have any significant rigidity on neurologic exam.  She has a mild tremor with outstretched hands.   PSYCHIATRIC:  The patient is awake, alert and oriented x3.  Mood and affect were appropriate.      LABORATORY AND  IMAGING DATA:  Reviewed above in HPI.      IMPRESSION AND PLAN:  Ms. Estrada is a 32-year-old female with a history of anxiety and depression.  She presents to the hospital today with unintentional Wellbutrin overdose.      The patient has not had medical insurance recently, has not been in to see her primary provider.  She has a prescription for Wellbutrin.  She was at a dose of 300 mg per day and on her own decided to double this dose to 600 mg per day 2 months ago.  More recently, 1 week ago she decided to increase this dose to 900 mg per day, again without any physician input.  Today she states she accidentally took double this amount, or 1800 mg today, accidentally.      1.  Accidental Wellbutrin overdose.  No suicidality or self-harm intent here.   2.  Anxiety history.  She has been unable to see a primary providers or mental health professionals given lack of insurance coverage recently.  It sounds like she has had some increased social stressors including relationship stress lately.   3.  Active smoker.   4.  Tachycardia and agitation related to accidental overdose.   5.  Mild rhabdomyolysis, suspect related to overdose.      PLAN:   1.  Admit to observation.   2.  IV fluid hydration overnight.   3.  Repeat CK level, BMP and liver function tests in the morning.   4.  Psychiatry consultation to assist with anxiety and perhaps offer some alternative treatment options.  She says she has tried \"everything\" in the past for " her anxiety, and Wellbutrin seems to work the best, although obviously not managing her symptoms well, given her recent attempts to self-medicate and increase her dose.   5.  Would reiterate on discharge of the patient that the maximum dose of Wellbutrin should be 300 mg per day.  Perhaps Psychiatry can recommend an alternative medication for her to use, either replacing Wellbutrin or in addition to it.     6.  No evidence of serotonin syndrome currently.  We will use intermittent Ativan dosing overnight for anxiety issues.   7.  Anticipate discharge likely tomorrow.         ONEIDA CURTIS MD             D: 2017 18:07   T: 2017 18:45   MT:       Name:     DEMETRIO LIM   MRN:      [id number]        Account:      [id number]   :      1985           Admitted:     [id number]      Document: [id number]

## 2017-11-17 NOTE — PROGRESS NOTES
Patient's demographic sheet shows no insurance. Contacted financial counselor to follow up with patient. FC already is aware of pt; they will continue to follow for insurance needs.     Marion Green RN, BSN, CTS  [facility]  [phone]

## 2017-11-17 NOTE — PLAN OF CARE
"Problem: Overdose, Ingestion/Inhalants (Adult)  Goal: Signs and Symptoms of Listed Potential Problems Will be Absent, Minimized or Managed (Overdose, Ingestion/Inhalants)  Signs and symptoms of listed potential problems will be absent, minimized or managed by discharge/transition of care (reference Overdose, Ingestion/Inhalants (Adult) CPG).   Outcome: No Change  PRIMARY DIAGNOSIS: \"GENERIC\" NURSING  OUTPATIENT/OBSERVATION GOALS TO BE MET BEFORE DISCHARGE:  1. ADLs back to baseline: Yes    2. Activity and level of assistance: Ambulating independently, calls appropriately if needs assistance. Spouse in room.    3. Pain status: Pain free.     4. Return to near baseline physical activity: Yes    5.   Anxiety present. Given PO ativan.     Discharge Planner Nurse   Safe discharge environment identified: Yes  Barriers to discharge: Yes, Psych Consult today       Entered by: Pablito Mclain 11/17/2017 12:56 AM     Please review provider order for any additional goals.   Nurse to notify provider when observation goals have been met and patient is ready for discharge.    VSS. A/O x 4. Generalized weakness noted w/ activity. Denies SOB/CP/Pain. NS infusing @ 125 mL/hr.      "

## 2017-11-17 NOTE — PLAN OF CARE
"Problem: Overdose, Ingestion/Inhalants (Adult)  Goal: Signs and Symptoms of Listed Potential Problems Will be Absent, Minimized or Managed (Overdose, Ingestion/Inhalants)  Signs and symptoms of listed potential problems will be absent, minimized or managed by discharge/transition of care (reference Overdose, Ingestion/Inhalants (Adult) CPG).   Outcome: No Change  PRIMARY DIAGNOSIS: \"GENERIC\" NURSING  OUTPATIENT/OBSERVATION GOALS TO BE MET BEFORE DISCHARGE:  1. ADLs back to baseline: Yes    2. Activity and level of assistance: Ambulating independently, calls appropriately if needs assistance. Spouse in room.    3. Pain status: Pain free.     4. Return to near baseline physical activity: Yes       Discharge Planner Nurse   Safe discharge environment identified: Yes  Barriers to discharge: Yes, Psych Consult today       Entered by: Pablito Mclain 11/17/2017 5:02 AM     Please review provider order for any additional goals.   Nurse to notify provider when observation goals have been met and patient is ready for discharge.    VSS. A/O x 4. Generalized weakness noted w/ activity. Denies SOB/CP/Pain. NS infusing @ 125 mL/hr. Continue POC. D/C home after further evaluation/treatment.       "

## 2017-11-17 NOTE — PLAN OF CARE
Problem: Overdose, Ingestion/Inhalants (Adult)  Goal: Signs and Symptoms of Listed Potential Problems Will be Absent, Minimized or Managed (Overdose, Ingestion/Inhalants)  Signs and symptoms of listed potential problems will be absent, minimized or managed by discharge/transition of care (reference Overdose, Ingestion/Inhalants (Adult) CPG).   Outcome: Improving  Ativan and atarax for tremors. Some improvement. Vss. ivf bolus given and IVF rate increased d/t elevated CK level. Per poison control, sx may take greater than 24 hours to improve as pt was already on this medication prior. Vss. A&Ox4 up ind in room.

## 2017-11-17 NOTE — PHARMACY-ADMISSION MEDICATION HISTORY
Admission medication history interview status for this patient is complete. See [facility] admission navigator for allergy information, prior to admission medications and immunization status.     Medication history interview source(s):Patient  Medication history resources (including written lists, pill bottles, clinic record):[facility]  Primary pharmacy: [facility]    Changes made to PTA medication list:  Added: all  Deleted: none  Changed: Bupropion to XL    Additional medication history information:      1.  Pharmacy has filled Propranolol ER 60mg daily - however per pt: last time she took was month ago.     2.  Pt reports taking Caffeine 200mg tabs, 2 to 3 tabs every morning    Medication reconciliation/reorder completed by provider prior to medication history? No    Prior to Admission medications    Medication Sig Last Dose Taking? Auth Provider   caffeine (NO-DOZE) 200 MG TABS tablet Take 400-600 mg by mouth every morning Per pt, takes 2 to 3 tabs every morning  Yes Unknown, Entered By History   buPROPion (WELLBUTRIN XL) 300 MG 24 hr tablet Take 300 mg by mouth every morning 11/16/2017 at Unknown time Yes Unknown, Entered By History   lisinopril (PRINIVIL/ZESTRIL) 10 MG tablet Take 10 mg by mouth daily 11/14/2017 Yes Unknown, Entered By History   vitamin D (ERGOCALCIFEROL) 92848 UNIT capsule Take 50,000 Units by mouth twice a week per pt, Tue and Thurs Yes Unknown, Entered By History   cyanocobalamin (VITAMIN B12) 1000 MCG/ML injection Inject 1 mL into the muscle every 30 days  Yes Unknown, Entered By History

## 2017-11-17 NOTE — PLAN OF CARE
Problem: Patient Care Overview  Goal: Discharge Needs Assessment  Outcome: No Change  Dyspnea  Patient complains of no shortness of breath.  Symptoms include no cough. Symptoms began 1 intermittently ago, gradually improving since that time.  Patient denies chest pain, located anterior chest. Associated symptoms include dyspnea. Patient does not have had recent travel.  Weight has been stable.  Appetite has been unchanged. Symptoms are exacerbated by emotional stress. Symptoms are alleviated by Ativan.

## 2017-11-17 NOTE — PLAN OF CARE
Problem: Patient Care Overview  Goal: Discharge Needs Assessment  Outcome: No Change  Dyspnea  Patient complains of no shortness of breath.  Symptoms include tightness in chest. Symptoms began a few intermittently ago, gradually improving since that time.  Patient denies chest pain, located anterior chest. Associated symptoms include dyspnea. Patient does not have had recent travel.  Weight has been stable.  Appetite has been unaffected. Symptoms are exacerbated by Cough. Symptoms are alleviated by rest.       Up SBA, alert and oriented x 4. No c/o chest pain or difficulty breathing. C/o discomfort in chest with cough. Pt was anxious and has tremors, ativan given x 1 with pt reporting decrease in  Anxiety. [facility] Poison Control called for follow up, this nurse updated Poison Control pt status. Per poison control recommend 2 mg Ativan to control anxiety and tremors, IV fluid and K replaced. Pt currently on IVF, K replaced pending recheck and MD notified poison control recommendation of Ativan dose.

## 2017-11-17 NOTE — CONSULTS
See dictation.   Psychiatry   Initial Consultation  Patient with improved insight, no deliberate sib. Contracts for safety. CKs elevated but creatinine, lfts wnl. Education given re dosing of antideps and options. Plan: continue stress mgmt with their  couples counselor. Begin REmeron for sleep and anxiety symptoms. REsume wellbutrin 300mg sr at discharge. Vistaril prn for anxiety breakthrough. Scripts for all three at discharge. FU with PMD and psychiatry.  Denies CD issues. Call prn until discharge home.     Kristy Mace MD  11/17/2017

## 2017-11-18 VITALS
BODY MASS INDEX: 24.66 KG/M2 | HEIGHT: 65 IN | WEIGHT: 148 LBS | DIASTOLIC BLOOD PRESSURE: 96 MMHG | HEART RATE: 106 BPM | OXYGEN SATURATION: 100 % | SYSTOLIC BLOOD PRESSURE: 126 MMHG | RESPIRATION RATE: 16 BRPM | TEMPERATURE: 98.1 F

## 2017-11-18 PROBLEM — M62.82 NON-TRAUMATIC RHABDOMYOLYSIS: Status: ACTIVE | Noted: 2017-11-18

## 2017-11-18 PROBLEM — F41.1 GAD (GENERALIZED ANXIETY DISORDER): Status: ACTIVE | Noted: 2017-11-18

## 2017-11-18 LAB
ANION GAP SERPL CALCULATED.3IONS-SCNC: 6 MMOL/L (ref 3–14)
BUN SERPL-MCNC: 4 MG/DL (ref 7–30)
CALCIUM SERPL-MCNC: 7.4 MG/DL (ref 8.5–10.1)
CHLORIDE SERPL-SCNC: 111 MMOL/L (ref 94–109)
CK SERPL-CCNC: 2015 U/L (ref 30–225)
CO2 SERPL-SCNC: 24 MMOL/L (ref 20–32)
CREAT SERPL-MCNC: 0.58 MG/DL (ref 0.52–1.04)
GFR SERPL CREATININE-BSD FRML MDRD: >90 ML/MIN/1.7M2
GLUCOSE SERPL-MCNC: 90 MG/DL (ref 70–99)
POTASSIUM SERPL-SCNC: 3.9 MMOL/L (ref 3.4–5.3)
SODIUM SERPL-SCNC: 141 MMOL/L (ref 133–144)

## 2017-11-18 PROCEDURE — 80048 BASIC METABOLIC PNL TOTAL CA: CPT | Performed by: INTERNAL MEDICINE

## 2017-11-18 PROCEDURE — 25000128 H RX IP 250 OP 636: Performed by: INTERNAL MEDICINE

## 2017-11-18 PROCEDURE — 25000132 ZZH RX MED GY IP 250 OP 250 PS 637: Performed by: HOSPITALIST

## 2017-11-18 PROCEDURE — 90686 IIV4 VACC NO PRSV 0.5 ML IM: CPT | Performed by: INTERNAL MEDICINE

## 2017-11-18 PROCEDURE — 36415 COLL VENOUS BLD VENIPUNCTURE: CPT | Performed by: INTERNAL MEDICINE

## 2017-11-18 PROCEDURE — 99239 HOSP IP/OBS DSCHRG MGMT >30: CPT | Performed by: INTERNAL MEDICINE

## 2017-11-18 PROCEDURE — 82550 ASSAY OF CK (CPK): CPT | Performed by: INTERNAL MEDICINE

## 2017-11-18 RX ORDER — VENLAFAXINE 37.5 MG/1
TABLET ORAL
Qty: 60 TABLET | Refills: 1 | Status: SHIPPED | OUTPATIENT
Start: 2017-11-18 | End: 2018-03-02

## 2017-11-18 RX ADMIN — SODIUM CHLORIDE: 9 INJECTION, SOLUTION INTRAVENOUS at 10:09

## 2017-11-18 RX ADMIN — DIAZEPAM 5 MG: 5 TABLET ORAL at 08:37

## 2017-11-18 RX ADMIN — SODIUM CHLORIDE: 9 INJECTION, SOLUTION INTRAVENOUS at 04:50

## 2017-11-18 RX ADMIN — INFLUENZA A VIRUS A/MICHIGAN/45/2015 X-275 (H1N1) ANTIGEN (FORMALDEHYDE INACTIVATED), INFLUENZA A VIRUS A/HONG KONG/4801/2014 X-263B (H3N2) ANTIGEN (FORMALDEHYDE INACTIVATED), INFLUENZA B VIRUS B/PHUKET/3073/2013 ANTIGEN (FORMALDEHYDE INACTIVATED), AND INFLUENZA B VIRUS B/BRISBANE/60/2008 ANTIGEN (FORMALDEHYDE INACTIVATED) 0.5 ML: 15; 15; 15; 15 INJECTION, SUSPENSION INTRAMUSCULAR at 11:32

## 2017-11-18 ASSESSMENT — ACTIVITIES OF DAILY LIVING (ADL)
ADLS_ACUITY_SCORE: 12

## 2017-11-18 NOTE — PLAN OF CARE
Problem: Patient Care Overview  Goal: Plan of Care/Patient Progress Review  Outcome: No Change    Dx: Overdose   Hx: Anxiety, Anemia, Depression, Vitamin-D Deficiency, and gastric bypass surgery.  Tele: NSR  Assessment: VSS. A/O x 4. Denies SOB/CP. Episode of BLE cramping, relieved w/ PO tylenol. PRN Ativan given x1 for anxiousness. Independent. PIV infusing NS @ 200 mL/hr. Spouse @ bedside throughout shift.   Plan: Continue POC. D/C home possibly today after further evaluation/treatment.

## 2017-11-18 NOTE — DISCHARGE SUMMARY
[facility] Discharge Summary    Bhavya Estrada MRN# [id number]   Age: 32 year old YOB: 1985     Date of Admission:  11/16/2017  Date of Discharge::  11/18/2017  Admitting Physician:  Rasheed Christian MD  Discharge Physician:  Rasheed Christian MD     Home clinic: [facility]          Admission Diagnoses:   Accidental caffeine overdose, initial encounter [T43.611A]  Accidental drug overdose, initial encounter [T50.901A]  Non-traumatic rhabdomyolysis [M62.82]          Discharge Diagnosis:   Principal Problem:    Bupropion overdose, accidental or unintentional, initial encounter  Active Problems:    WIN (generalized anxiety disorder)    Non-traumatic rhabdomyolysis            Procedures:   none          Medications Prior to Admission:     Prescriptions Prior to Admission   Medication Sig Dispense Refill Last Dose     caffeine (NO-DOZE) 200 MG TABS tablet Take 400-600 mg by mouth every morning Per pt, takes 2 to 3 tabs every morning        lisinopril (PRINIVIL/ZESTRIL) 10 MG tablet Take 10 mg by mouth daily   11/14/2017     vitamin D (ERGOCALCIFEROL) 41673 UNIT capsule Take 50,000 Units by mouth twice a week   per pt, Tue and Thurs     cyanocobalamin (VITAMIN B12) 1000 MCG/ML injection Inject 1 mL into the muscle every 30 days        [DISCONTINUED] buPROPion (WELLBUTRIN XL) 300 MG 24 hr tablet Take 300 mg by mouth every morning   11/16/2017 at Unknown time             Discharge Medications:     Current Discharge Medication List      START taking these medications    Details   venlafaxine (EFFEXOR) 37.5 MG tablet Take 1 po daily x 4 days, then increase to 1 twice daily.  Qty: 60 tablet, Refills: 1    Associated Diagnoses: WIN (generalized anxiety disorder)         CONTINUE these medications which have NOT CHANGED    Details   caffeine (NO-DOZE) 200 MG TABS tablet Take 400-600 mg by mouth every morning Per pt, takes 2 to 3 tabs every morning      lisinopril (PRINIVIL/ZESTRIL) 10 MG tablet  "Take 10 mg by mouth daily      vitamin D (ERGOCALCIFEROL) 60859 UNIT capsule Take 50,000 Units by mouth twice a week      cyanocobalamin (VITAMIN B12) 1000 MCG/ML injection Inject 1 mL into the muscle every 30 days         STOP taking these medications       buPROPion (WELLBUTRIN XL) 300 MG 24 hr tablet Comments:   Reason for Stopping:                     Consultations:   Consultation during this admission received from Psychiatry.           Hospital Course:   Bhavya Estrada is a 32 year old female who came to attention on 11/16/2017 with severe anxiety after having tried to self-medicate with Bupropion. Her ingestion is well-documented in other notes, with pt having tripled her prescribed dose without consulting with her provider.      Ms. Estrada was admitted initially to observation and was incidentally noted to have significantly elevated CK. She complained of myalgias involving the axial and proximal muscles, but otherwise denied excessive exercising as well as injury.      Problem List:   1. Bupropion overdose with unintentional excess intake. The patient was conservatively managed. She had no dangerous rhythm disturbances noted while monitored on telemetry throughout her stay.  2. Depression and Anxiety that has not been adequately controlled on Bupropion. Dr. Mace's note also indicates that the patient has tried and failed various SSRI's. She did describe to me intolerable GERD sx with Sertraline, but had not been tried on other SSRIs as far as the patient is able to tell me. She does not feel that the Hydroxyzine is helpful and wants to avoid Mirtazapine due to the associated tendency to cause weight gain.  3. Rhabdomyolysis. Peak CK is 3347 on 11/17 after which I gave pt an NS flush with increased IV NS rate. CK trended down and was about 2000 on the date of discharge.    BP (!) 126/96 (BP Location: Left arm)  Pulse 106  Temp 98.1  F (36.7  C) (Oral)  Resp 16  Ht 1.651 m (5' 5\")  Wt 67.1 kg (148 lb) "  SpO2 100%  BMI 24.63 kg/m2  At the time of discharge, Ms. Estrada is alert and comfortable. Her  is at her side in the hospital. She is able to ambulate independently and reports eating satisfactorily.  Chest: CTA  COR: RRR without murmur  Abd: soft, NTND          Discharge Instructions and Follow-Up:   Discharge diet: Regular   Discharge activity: Activity as tolerated   Discharge follow-up: Follow up with primary care provider in the next 2 weeks to readdress the new medication.           Discharge Disposition:   Discharged to home      Attestation:  I have reviewed today's vital signs, notes, medications, labs and imaging.  Total time: 35 minutes    Rasheed Christian MD

## 2017-11-18 NOTE — PROGRESS NOTES
[facility]  Hospitalist Progress Note  Rasheed Christian MD 11/17/2017    Reason for Stay (Diagnosis): accidental overdose of Bupropion.         Assessment and Plan:      Summary of Stay: Bhavya Estrada is a 32 year old female who came to attention on 11/16/2017 with severe anxiety after having tried to self-medicate with Bupropion. Her ingestion is well-documented in other notes, with pt having trippled her prescribed dose without consulting with her provider.     Ms. Estrada was admitted last night to observation and was incidentally noted to have significantly elevated CK. She complains of myalgias involving the axial and proximal muscles, but otherwise denies excessive exercising and injury.     Problem List:   1. Bupropion overdose with unintentional excess intake.   2. Depression and Anxiety that has not been adequately controlled on Bupropion. Dr. Mace's note also indicates that the patient has tried and failed various SSRI's. She did describe intolerable GERD sx with Sertraline. She does not feel that the Hydroxyzine is helpful.  3. Rhabdomyolysis. Peak CK is 3347 this am after which I gave pt an NS flush with increased IV NS rate. CK is trending down at this time.     PLAN:  1. Cont with IVF as noted. Recheck BMP and CK in am.   2. Consider trial of Venlafaxine upon discharge, rather than resumption of Bupropion.   3. Mirtazapine as recommended by Dr. Mace, at HS for sleep.   4. OK to try Lorazepam and promethazine for nausea overnight.     DVT Prophylaxis: Ambulate every shift  Code Status: Full Code  Discharge Dispo: home  Estimated Disch Date / # of Days until Disch: likely tomorrow, pending decrease in CK and on-going stability regarding withdrawal of Bupropion.    I called [facility] and discussed switch to Inpatient Status today, based on the newly identified Rhabdomyolysis.         Interval History (Subjective):      Chart reviewed, pt interviewed.      As noted above, pt has been on Sertraline  "in the past. Dr. Mace probably got a more complete psych history than I did, but can confirm with pt tomorrow.  Thuan SOB or cough. Very achey in muscles.   Vomited up Hydroxyzine this afternoon.   Urinating briskly.                   Physical Exam:      Last Vital Signs:  /84 (BP Location: Left arm)  Pulse 106  Temp 98  F (36.7  C) (Oral)  Resp 18  Ht 1.651 m (5' 5\")  Wt 67.1 kg (148 lb)  SpO2 99%  BMI 24.63 kg/m2    I/O last 3 completed shifts:  In: 2929 [I.V.:2929]  Out: -     Constitutional: Awake, alert, cooperative, no apparent distress   Respiratory: Clear to auscultation bilaterally, no crackles or wheezing   Cardiovascular: Regular rate and rhythm, normal S1 and S2, and no murmur noted   Abdomen: Normal bowel sounds, soft, non-distended, non-tender   Skin: No rashes, no cyanosis, dry to touch   Neuro: Alert and oriented x3, no weakness, numbness, memory loss   Extremities: No edema, normal range of motion   Other(s):        All other systems: Negative          Medications:      All current medications were reviewed with changes reflected in problem list.         Data:      All new lab and imaging data was reviewed.   Labs/Imaging:  Results for orders placed or performed during the hospital encounter of 11/16/17 (from the past 24 hour(s))   Potassium   Result Value Ref Range    Potassium 3.9 3.4 - 5.3 mmol/L   CK total   Result Value Ref Range    CK Total 3347 (HH) 30 - 225 U/L   Basic metabolic panel   Result Value Ref Range    Sodium 140 133 - 144 mmol/L    Potassium 4.2 3.4 - 5.3 mmol/L    Chloride 111 (H) 94 - 109 mmol/L    Carbon Dioxide 24 20 - 32 mmol/L    Anion Gap 5 3 - 14 mmol/L    Glucose 80 70 - 99 mg/dL    Urea Nitrogen 4 (L) 7 - 30 mg/dL    Creatinine 0.66 0.52 - 1.04 mg/dL    GFR Estimate >90 >60 mL/min/1.7m2    GFR Estimate If Black >90 >60 mL/min/1.7m2    Calcium 7.2 (L) 8.5 - 10.1 mg/dL   Hepatic panel   Result Value Ref Range    Bilirubin Direct 0.1 0.0 - 0.2 mg/dL    Bilirubin " Total 0.5 0.2 - 1.3 mg/dL    Albumin 3.0 (L) 3.4 - 5.0 g/dL    Protein Total 5.5 (L) 6.8 - 8.8 g/dL    Alkaline Phosphatase 55 40 - 150 U/L    ALT 23 0 - 50 U/L    AST 29 0 - 45 U/L   CK total   Result Value Ref Range    CK Total 3128 () 30 - 225 U/L

## 2017-11-18 NOTE — CONSULTS
PSYCHIATRIC CONSULTATION         REQUESTING PHYSICIAN:  Butch Cowan MD      REASON FOR CONSULTATION:  The patient Bhavya Estrada is a 32-year-old   female who is seen for psychiatric evaluation at the request of Dr. Cowan after she was admitted following a Wellbutrin over-ingestion.      HISTORY OF PRESENT ILLNESS:  The patient presented to the Emergency Department because of increased anxiety and stressors for the past several weeks.  She had increased her Wellbutrin on her own to 600 mg and then several days later to 900 mg daily and felt this had improved her symptoms.  She had accidentally taken the increased dose twice on the day of admission for a total of 1,800 mg.  She also takes caffeine tablets.  She had experienced shortness of breath and tremor.  Drug screen was negative for drugs of abuse.  Blood pressure in the Emergency Department was elevated at 175/100 with a heart rate of 137 and CK elevated to the 2,000-3,000 range.        The patient was admitted to the General Medical Floor and is feeling much better with IV hydration.  Wellbutrin has been held.  She eats small meals due to a gastric bypass.  She states she is compliant with medical care and denies ever having overdosed before.  She states regret over her actions and adamantly denies suicidal ideation.  She denies illicit drug use.      PAST PSYCHIATRIC HISTORY:  No suicide attempts, manic or psychotic episodes, denies chemical dependency.  She and her  have started couples counseling after going through a stressful period 4-6 weeks ago which she does not wish to discuss.  She has been on multiple SSRIs and BuSpar which were not helpful.  Vistaril was somewhat helpful.      PAST MEDICAL HISTORY:     1.  Polycystic ovarian syndrome.   2.  Gastric bypass surgery.   3.  Gestational diabetes.   4.  Vitamin D deficiency.   No history of closed head trauma or seizure.        ALLERGIES:  No known drug allergies, but avoids  "NSAIDs due to gastric bypass.      FAMILY HISTORY:  The patient denies mental illness, chemical dependency or suicide in the family.      SOCIAL HISTORY:  The patient has 3 young children, one of whom is approximately 1 year old.  She is a stay-at-home mom.  She is nicotine dependent.  She is using Wellbutrin for aenxiety.      REVIEW OF SYSTEMS:  No current chest pain, shortness of breath or lightheadedness.      EXAMINATION:   VITAL SIGNS:  Afebrile, blood pressure 175/85, pulse 100.   MENTAL STATUS EXAMINATION:  The patient is a well-nourished brunette who does appear her stated age.  She is pleasant and polite.  She is not in a delusional state with no agitation or paranoia.  Thought content is negative for hallucinations, suicidal or homicidal ideation.  She has reasonable comprehension and fund of knowledge and likely has average range of intelligence.  There is no memory disturbance or dissociation.  Gait is not assessed as she has an IV in place.  There is no tic or tremor noted.  Mood is described as \"good\" with recent anxiety.      PSYCHIATRIC DIAGNOSES:   1.  Depressive disorder.   2.  Generalized anxiety disorder   3.  Partner relational problem.   4.  Rule out caffeine abuse.      PLAN:  The patient and I discussed treatment options.  Given her current sleeplessness and general anxiety I recommended Remeron 7.5 to take 50 mg each day at bedtime.  She may resume Wellbutrin, but no more than 300 mg daily in the 48 hours after discharge.  She is not holdable.  There is no evidence of deliberate suicide attempt, and she is cassidy for safety.        Please call with questions and/or concerns.  I feel the patient is ready for discharge.         PAPO YOUNG MD             D: 2017 17:00   T: 2017 18:18   MT: [id number]      Name:     DEMETRIO LIM   MRN:      [id number]        Account:       [id number]   :      1985           Consult Date:  2017      Document: [id number]  "      [facility]

## 2018-03-02 ENCOUNTER — APPOINTMENT (OUTPATIENT)
Dept: GENERAL RADIOLOGY | Facility: CLINIC | Age: 33
End: 2018-03-02
Attending: EMERGENCY MEDICINE
Payer: COMMERCIAL

## 2018-03-02 ENCOUNTER — HOSPITAL ENCOUNTER (EMERGENCY)
Facility: CLINIC | Age: 33
Discharge: HOME | End: 2018-03-02
Attending: EMERGENCY MEDICINE | Admitting: EMERGENCY MEDICINE
Payer: COMMERCIAL

## 2018-03-02 VITALS
OXYGEN SATURATION: 99 % | WEIGHT: 127 LBS | TEMPERATURE: 98.9 F | BODY MASS INDEX: 21.13 KG/M2 | SYSTOLIC BLOOD PRESSURE: 117 MMHG | RESPIRATION RATE: 18 BRPM | DIASTOLIC BLOOD PRESSURE: 89 MMHG

## 2018-03-02 DIAGNOSIS — R10.13 ABDOMINAL PAIN, EPIGASTRIC: ICD-10-CM

## 2018-03-02 LAB
ALBUMIN SERPL-MCNC: 3.3 G/DL (ref 3.4–5)
ALP SERPL-CCNC: 62 U/L (ref 40–150)
ALT SERPL W P-5'-P-CCNC: 18 U/L (ref 0–50)
ANION GAP SERPL CALCULATED.3IONS-SCNC: 6 MMOL/L (ref 3–14)
AST SERPL W P-5'-P-CCNC: 14 U/L (ref 0–45)
BASOPHILS # BLD AUTO: 0 10E9/L (ref 0–0.2)
BASOPHILS NFR BLD AUTO: 0.5 %
BILIRUB SERPL-MCNC: 0.4 MG/DL (ref 0.2–1.3)
BUN SERPL-MCNC: 13 MG/DL (ref 7–30)
CALCIUM SERPL-MCNC: 8 MG/DL (ref 8.5–10.1)
CHLORIDE SERPL-SCNC: 107 MMOL/L (ref 94–109)
CO2 SERPL-SCNC: 26 MMOL/L (ref 20–32)
CREAT SERPL-MCNC: 0.58 MG/DL (ref 0.52–1.04)
DIFFERENTIAL METHOD BLD: ABNORMAL
EOSINOPHIL # BLD AUTO: 0.1 10E9/L (ref 0–0.7)
EOSINOPHIL NFR BLD AUTO: 1.2 %
ERYTHROCYTE [DISTWIDTH] IN BLOOD BY AUTOMATED COUNT: 17.2 % (ref 10–15)
GFR SERPL CREATININE-BSD FRML MDRD: >90 ML/MIN/1.7M2
GLUCOSE SERPL-MCNC: 86 MG/DL (ref 70–99)
HCT VFR BLD AUTO: 31 % (ref 35–47)
HGB BLD-MCNC: 10 G/DL (ref 11.7–15.7)
IMM GRANULOCYTES # BLD: 0 10E9/L (ref 0–0.4)
IMM GRANULOCYTES NFR BLD: 0.3 %
LIPASE SERPL-CCNC: 176 U/L (ref 73–393)
LYMPHOCYTES # BLD AUTO: 1.4 10E9/L (ref 0.8–5.3)
LYMPHOCYTES NFR BLD AUTO: 23.5 %
MCH RBC QN AUTO: 28.9 PG (ref 26.5–33)
MCHC RBC AUTO-ENTMCNC: 32.3 G/DL (ref 31.5–36.5)
MCV RBC AUTO: 90 FL (ref 78–100)
MONOCYTES # BLD AUTO: 0.4 10E9/L (ref 0–1.3)
MONOCYTES NFR BLD AUTO: 6.5 %
NEUTROPHILS # BLD AUTO: 4 10E9/L (ref 1.6–8.3)
NEUTROPHILS NFR BLD AUTO: 68 %
NRBC # BLD AUTO: 0 10*3/UL
NRBC BLD AUTO-RTO: 0 /100
PLATELET # BLD AUTO: 276 10E9/L (ref 150–450)
POTASSIUM SERPL-SCNC: 3.7 MMOL/L (ref 3.4–5.3)
PROT SERPL-MCNC: 6.1 G/DL (ref 6.8–8.8)
RBC # BLD AUTO: 3.46 10E12/L (ref 3.8–5.2)
SODIUM SERPL-SCNC: 139 MMOL/L (ref 133–144)
WBC # BLD AUTO: 5.8 10E9/L (ref 4–11)

## 2018-03-02 PROCEDURE — 25000132 ZZH RX MED GY IP 250 OP 250 PS 637: Performed by: EMERGENCY MEDICINE

## 2018-03-02 PROCEDURE — 99284 EMERGENCY DEPT VISIT MOD MDM: CPT

## 2018-03-02 PROCEDURE — 80053 COMPREHEN METABOLIC PANEL: CPT | Performed by: EMERGENCY MEDICINE

## 2018-03-02 PROCEDURE — 83690 ASSAY OF LIPASE: CPT | Performed by: EMERGENCY MEDICINE

## 2018-03-02 PROCEDURE — 25000125 ZZHC RX 250: Performed by: EMERGENCY MEDICINE

## 2018-03-02 PROCEDURE — 74019 RADEX ABDOMEN 2 VIEWS: CPT

## 2018-03-02 PROCEDURE — 85025 COMPLETE CBC W/AUTO DIFF WBC: CPT | Performed by: EMERGENCY MEDICINE

## 2018-03-02 PROCEDURE — 25000128 H RX IP 250 OP 636: Performed by: EMERGENCY MEDICINE

## 2018-03-02 PROCEDURE — 96374 THER/PROPH/DIAG INJ IV PUSH: CPT

## 2018-03-02 RX ORDER — SUCRALFATE ORAL 1 G/10ML
1 SUSPENSION ORAL 4 TIMES DAILY PRN
Qty: 210 ML | Refills: 0 | Status: SHIPPED | OUTPATIENT
Start: 2018-03-02

## 2018-03-02 RX ORDER — ONDANSETRON 2 MG/ML
4 INJECTION INTRAMUSCULAR; INTRAVENOUS ONCE
Status: COMPLETED | OUTPATIENT
Start: 2018-03-02 | End: 2018-03-02

## 2018-03-02 RX ORDER — ONDANSETRON 4 MG/1
4 TABLET, ORALLY DISINTEGRATING ORAL EVERY 8 HOURS PRN
Qty: 10 TABLET | Refills: 0 | Status: SHIPPED | OUTPATIENT
Start: 2018-03-02 | End: 2018-03-05

## 2018-03-02 RX ADMIN — LIDOCAINE HYDROCHLORIDE 30 ML: 20 SOLUTION ORAL; TOPICAL at 16:54

## 2018-03-02 RX ADMIN — ONDANSETRON 4 MG: 2 INJECTION INTRAMUSCULAR; INTRAVENOUS at 16:54

## 2018-03-02 ASSESSMENT — ENCOUNTER SYMPTOMS
DIARRHEA: 0
SHORTNESS OF BREATH: 0
FEVER: 0
COUGH: 0
CHILLS: 0
ABDOMINAL PAIN: 1
VOMITING: 1
CONSTIPATION: 0
NAUSEA: 1

## 2018-03-02 NOTE — ED NOTES
"Patient presents to ED with c/o heartburn, CP, vomiting and states she's lost 21 pounds in the last 3 weeks because she has not been able to eat or drink. Pt had gastric bypass in 2006. Feels like something is \"stuck\" in throat.   "

## 2018-03-02 NOTE — ED PROVIDER NOTES
History     Chief Complaint:  Chest Pain and Vomiting    HPI   Bhavya Estrada is a 32 year old female who presents with nausea, vomiting, and chest pain. The patient has a history notable for gastric bypass in 2006 as well as cholecystectomy 5 years ago. The patient states that she has chronic constipation at baseline and is on daily laxatives but otherwise has not had many complications apart from long standing GERD. Approximately 3 weeks ago the patient states she began feeling nauseous and had a lack of appetite. She was citing stress to her symptoms but notes that this has berto persistent since that time. Over the past 3 days she has since developed a burning chest pain described as heart burn but now vomiting as well with any oral intake. She describes a sensation of something getting stuck in her throat, causing her to vomit the food back up. She has been able to infrequently tolerate water but at times she vomited this up as well. Today, she likewise was unable to tolerate any oral intake prompting her visit here for evaluation. She describes ongoing burning pain. She has tried Tums and Prevacid without relief. She otherwise last had a bowel movement two days ago which is not atypical for her. She denies any fevers, chills, cough, shortness of breath, diarrhea, or urinary symptoms.    Allergies:  Aspirin  Chantix [Varenicline]  Ibuprofen Sodium    Medications:    Tylenol   TUMS  Prevacid    Past Medical History:    WIN  Rhabdomyolysis  Bupropion overdose  Metabolic syndrome X  GERD  PCOS  Anemia  Anxiety  Depressive disorder  Diabetes    Past Surgical History:    Gastric bypass - Dr. Rudolph Alvarado in 2006  Tonsillectomy  Gyn surgery  Hernia repair   Cholecystectomy  Intestinal anastomosis surgery     Family History:    Neurologic disorder     Social History:  Smoking Status: Current Smoker  Alcohol Use: Yes, casually  Patient presents alone.     Review of Systems   Constitutional: Negative for  chills and fever.   Respiratory: Negative for cough and shortness of breath.    Cardiovascular: Positive for chest pain.   Gastrointestinal: Positive for abdominal pain, nausea and vomiting. Negative for constipation and diarrhea.   All other systems reviewed and are negative.      Physical Exam   Patient Vitals for the past 24 hrs:   BP Temp Temp src Heart Rate Resp SpO2 Weight   03/02/18 1726 - - - - - 100 % -   03/02/18 1725 117/89 - - - - - -   03/02/18 1543 (!) 127/97 98.9  F (37.2  C) Oral 92 18 97 % 57.6 kg (127 lb)      Physical Exam  Nursing note and vitals reviewed.  Constitutional: Cooperative.   HENT:   Mouth/Throat: Moist mucous membranes.   Eyes: EOMI, nonicteric sclera  Cardiovascular: Normal rate, regular rhythm, no murmurs, rubs, or gallops  Pulmonary/Chest: Effort normal and breath sounds normal. No respiratory distress. No wheezes. No rales.   Abdominal: Soft. Mild epigastric TTP, nondistended, no guarding or rigidity. BS present.   Musculoskeletal: Normal range of motion.   Neurological: Alert. Moves all extremities spontaneously.   Skin: Skin is warm and dry. No rash noted.   Psychiatric: Normal mood and affect.       Emergency Department Course     Imaging:  Radiographic findings were communicated with the patient who voiced understanding of the findings.    X-ray Abdomen, flat and upright views:  The bowel gas pattern is unremarkable. No evidence of  obstruction. No pneumoperitoneum. The lung bases are clear.  Cholecystectomy clips and IUD in place.  Result per radiology.      Laboratory:  CBC: HGB 10.0 (L), o/w WNL (WBC 5.8, )    CMP: Ca 8.0 (L), Albumin 3.3 (L), Protein total 6.1 (L), o/w WNL (Creatinine 0.58)   Lipase: 176    Emergency Department Course:  Past medical records, nursing notes, and vitals reviewed.  1631: I performed an exam of the patient and obtained history, as documented above.   IV inserted and blood drawn.     The patient was sent for a X-ray while in the  emergency department, findings above.      1930: I rechecked the patient. Findings and plan explained to the Patient. Patient discharged home with instructions regarding supportive care, medications, and reasons to return. The importance of close follow-up was reviewed.      Impression & Plan      Medical Decision Making:  Bhavya Estrada is a 32 year old female who presents with chief complaint of epigastric pain and multiple episodes of nausea and vomiting. This is an intermittent complaint for the patient but symptoms are most consistent with gastritis. The patient no longer has a gallbladder and her LFTs are normal. Lipase is similarly normal. She does have improvement after a GI cocktail. The patient likely needs evaluation by gastroenterology and I placed her referral for an endoscopy and gave her the contact information for [facility] who have scoped her before. I will also start her on BID Prilosec in addition to some Carafate. The patient reports that she is unable to eat or drink, however she did drink here without difficulty and there are no signs of renal insufficiency to suggest dehydration. I will also send her home with some Zofran in case she does have some more vomiting and discussed dietary restrictions until follow up.    Diagnosis:    ICD-10-CM    1. Abdominal pain, epigastric R10.13      Discharge Medications:  New Prescriptions    OMEPRAZOLE (PRILOSEC) 20 MG CR CAPSULE    Take 1 capsule (20 mg) by mouth 2 times daily for 14 days    ONDANSETRON (ZOFRAN ODT) 4 MG ODT TAB    Take 1 tablet (4 mg) by mouth every 8 hours as needed for nausea    SUCRALFATE (CARAFATE) 1 GM/10ML SUSPENSION    Take 10 mLs (1 g) by mouth 4 times daily as needed       Benson Lara  3/2/2018   [facility] EMERGENCY DEPARTMENT  I, Benson Lara, am serving as a scribe at 4:31 PM on 3/2/2018 to document services personally performed by Toi Berumen MD based on my observations and the provider's  statements to me.       Toi Berumen MD  03/04/18 [id number]

## 2018-03-02 NOTE — ED AVS SNAPSHOT
[facility] Emergency Department    [address]    [facility] [address]    Phone:  [phone]    Fax:  [phone]                                       Bhavya Estrada   MRN: [id number]    Department:  [facility] Emergency Department   Date of Visit:  3/2/2018           Patient Information     Date Of Birth          1985        Your diagnoses for this visit were:     Abdominal pain, epigastric        You were seen by Toi Berumen MD.      Follow-up Information     Follow up with [facility] In 3 days.    Why:  for recheck and follow-up     Contact information:    [address]  [facility] [id number] [phone]          Follow up with [facility] Emergency Department.    Specialty:  EMERGENCY MEDICINE    Why:  As needed, If symptoms worsen    Contact information:    [address]  [facility] [id number] [phone]        [facility], [facility].    Contact information:    [address] [address]  [facility] [address] [phone]        Discharge References/Attachments     EPIGASTRIC PAIN (UNCERTAIN CAUSE) (ENGLISH)      24 Hour Appointment Hotline       To make an appointment at any [facility], call [phone] ([phone]). If you don't have a family doctor or clinic, we will help you find one. [facility] are conveniently located to serve the needs of you and your family.          ED Discharge Orders     GASTROENTEROLOGY ADULT REF PROCEDURE ONLY       Last Lab Result: Creatinine (mg/dL)       Date                     Value                 03/02/2018               0.58             ----------  Body mass index is 21.13 kg/(m^2).     Needed:  No  Language:  English    Patient will be contacted to schedule procedure.     Please be aware that coverage of these services is subject to the terms and limitations of your health insurance plan.  Call member services at your health  plan with any benefit or coverage questions.  Any procedures must be performed at a [facility] facility OR coordinated by your clinic's referral office.    Please bring the following with you to your appointment:    (1) Any X-Rays, CTs or MRIs which have been performed.  Contact the facility where they were done to arrange for  prior to your scheduled appointment.    (2) List of current medications   (3) This referral request   (4) Any documents/labs given to you for this referral                     Review of your medicines      START taking        Dose / Directions Last dose taken    omeprazole 20 MG CR capsule   Commonly known as:  priLOSEC   Dose:  20 mg   Quantity:  28 capsule        Take 1 capsule (20 mg) by mouth 2 times daily for 14 days   Refills:  0        ondansetron 4 MG ODT tab   Commonly known as:  ZOFRAN ODT   Dose:  4 mg   Quantity:  10 tablet        Take 1 tablet (4 mg) by mouth every 8 hours as needed for nausea   Refills:  0        sucralfate 1 GM/10ML suspension   Commonly known as:  CARAFATE   Dose:  1 g   Quantity:  210 mL        Take 10 mLs (1 g) by mouth 4 times daily as needed   Refills:  0          Our records show that you are taking the medicines listed below. If these are incorrect, please call your family doctor or clinic.        Dose / Directions Last dose taken    caffeine 200 MG Tabs tablet   Commonly known as:  NO-DOZE   Dose:  400-600 mg        Take 400-600 mg by mouth every morning Per pt, takes 2 to 3 tabs every morning   Refills:  0        TYLENOL 500 MG tablet   Dose:  1-2 tablet   Generic drug:  acetaminophen        Take 1-2 tablets by mouth every 6 hours as needed.   Refills:  0          STOP taking        Dose Reason for stopping Comments    PREVACID PO                      Prescriptions were sent or printed at these locations (3 Prescriptions)                   Other Prescriptions                Printed at Department/Unit printer (3 of 3)         omeprazole (PRILOSEC)  20 MG CR capsule               ondansetron (ZOFRAN ODT) 4 MG ODT tab               sucralfate (CARAFATE) 1 GM/10ML suspension                Procedures and tests performed during your visit     Abdomen XR, 2 vw, flat and upright    CBC with platelets differential    Comprehensive metabolic panel    Lipase      Orders Needing Specimen Collection     None      Pending Results     No orders found from 2/28/2018 to 3/3/2018.            Pending Culture Results     No orders found from 2/28/2018 to 3/3/2018.            Pending Results Instructions     If you had any lab results that were not finalized at the time of your Discharge, you can call the ED Lab Result RN at [phone]. You will be contacted by this team for any positive Lab results or changes in treatment. The nurses are available 7 days a week from 10A to 6:30P.  You can leave a message 24 hours per day and they will return your call.        Test Results From Your Hospital Stay        3/2/2018  5:15 PM      Component Results     Component Value Ref Range & Units Status    WBC 5.8 4.0 - 11.0 10e9/L Final    RBC Count 3.46 (L) 3.8 - 5.2 10e12/L Final    Hemoglobin 10.0 (L) 11.7 - 15.7 g/dL Final    Hematocrit 31.0 (L) 35.0 - 47.0 % Final    MCV 90 78 - 100 fl Final    MCH 28.9 26.5 - 33.0 pg Final    MCHC 32.3 31.5 - 36.5 g/dL Final    RDW 17.2 (H) 10.0 - 15.0 % Final    Platelet Count 276 150 - 450 10e9/L Final    Diff Method Automated Method  Final    % Neutrophils 68.0 % Final    % Lymphocytes 23.5 % Final    % Monocytes 6.5 % Final    % Eosinophils 1.2 % Final    % Basophils 0.5 % Final    % Immature Granulocytes 0.3 % Final    Nucleated RBCs 0 0 /100 Final    Absolute Neutrophil 4.0 1.6 - 8.3 10e9/L Final    Absolute Lymphocytes 1.4 0.8 - 5.3 10e9/L Final    Absolute Monocytes 0.4 0.0 - 1.3 10e9/L Final    Absolute Eosinophils 0.1 0.0 - 0.7 10e9/L Final    Absolute Basophils 0.0 0.0 - 0.2 10e9/L Final    Abs Immature Granulocytes 0.0 0 - 0.4 10e9/L Final     Absolute Nucleated RBC 0.0  Final         3/2/2018  5:32 PM      Component Results     Component Value Ref Range & Units Status    Sodium 139 133 - 144 mmol/L Final    Potassium 3.7 3.4 - 5.3 mmol/L Final    Chloride 107 94 - 109 mmol/L Final    Carbon Dioxide 26 20 - 32 mmol/L Final    Anion Gap 6 3 - 14 mmol/L Final    Glucose 86 70 - 99 mg/dL Final    Urea Nitrogen 13 7 - 30 mg/dL Final    Creatinine 0.58 0.52 - 1.04 mg/dL Final    GFR Estimate >90 >60 mL/min/1.7m2 Final    Non  GFR Calc    GFR Estimate If Black >90 >60 mL/min/1.7m2 Final    African American GFR Calc    Calcium 8.0 (L) 8.5 - 10.1 mg/dL Final    Bilirubin Total 0.4 0.2 - 1.3 mg/dL Final    Albumin 3.3 (L) 3.4 - 5.0 g/dL Final    Protein Total 6.1 (L) 6.8 - 8.8 g/dL Final    Alkaline Phosphatase 62 40 - 150 U/L Final    ALT 18 0 - 50 U/L Final    AST 14 0 - 45 U/L Final         3/2/2018  5:32 PM      Component Results     Component Value Ref Range & Units Status    Lipase 176 73 - 393 U/L Final         3/2/2018  5:35 PM      Narrative     XR ABDOMEN 2 VW 3/2/2018 5:30 PM    HISTORY: Pain.    COMPARISON: None.        Impression     IMPRESSION: The bowel gas pattern is unremarkable. No evidence of  obstruction. No pneumoperitoneum. The lung bases are clear.  Cholecystectomy clips and IUD in place.    LATOSHA MITCHELL MD                Clinical Quality Measure: Blood Pressure Screening     Your blood pressure was checked while you were in the emergency department today. The last reading we obtained was  BP: 117/89 . Please read the guidelines below about what these numbers mean and what you should do about them.  If your systolic blood pressure (the top number) is less than 120 and your diastolic blood pressure (the bottom number) is less than 80, then your blood pressure is normal. There is nothing more that you need to do about it.  If your systolic blood pressure (the top number) is 120-139 or your diastolic blood pressure (the  bottom number) is 80-89, your blood pressure may be higher than it should be. You should have your blood pressure rechecked within a year by a primary care provider.  If your systolic blood pressure (the top number) is 140 or greater or your diastolic blood pressure (the bottom number) is 90 or greater, you may have high blood pressure. High blood pressure is treatable, but if left untreated over time it can put you at risk for heart attack, stroke, or kidney failure. You should have your blood pressure rechecked by a primary care provider within the next 4 weeks.  If your provider in the emergency department today gave you specific instructions to follow-up with your doctor or provider even sooner than that, you should follow that instruction and not wait for up to 4 weeks for your follow-up visit.        Thank you for choosing [facility]       Thank you for choosing [facility] for your care. Our goal is always to provide you with excellent care. Hearing back from our patients is one way we can continue to improve our services. Please take a few minutes to complete the written survey that you may receive in the mail after you visit with us. Thank you!        [employer] Information     [employer] gives you secure access to your electronic health record. If you see a primary care provider, you can also send messages to your care team and make appointments. If you have questions, please call your primary care clinic.  If you do not have a primary care provider, please call [phone] and they will assist you.        Care EveryWhere ID     This is your Care EveryWhere ID. This could be used by other organizations to access your [facility] medical records  [id number]        Equal Access to Services     ANDREW VINCENT : Hadii doreen Agustin, wachanada nas, qaybta kaaldanielle llanos. So [facility] [phone].    ATENCIÓN: Si habla español, tiene a kitchen disposición servicios gratuitos  de asistencia lingüística. [place] antoine [phone].    We comply with applicable federal civil rights laws and [place] laws. We do not discriminate on the basis of race, color, national origin, age, disability, sex, sexual orientation, or gender identity.            After Visit Summary       This is your record. Keep this with you and show to your community pharmacist(s) and doctor(s) at your next visit.

## 2018-03-02 NOTE — ED AVS SNAPSHOT
[facility] Emergency Department    [address]    [facility] [address]    Phone:  [phone]    Fax:  [phone]                                       Bhavya Estrada   MRN: [id number]    Department:  [facility] Emergency Department   Date of Visit:  3/2/2018           After Visit Summary Signature Page     I have received my discharge instructions, and my questions have been answered. I have discussed any challenges I see with this plan with the nurse or doctor.    ..........................................................................................................................................  Patient/Patient Representative Signature      ..........................................................................................................................................  Patient Representative Print Name and Relationship to Patient    ..................................................               ................................................  Date                                            Time    ..........................................................................................................................................  Reviewed by Signature/Title    ...................................................              ..............................................  Date                                                            Time

## 2019-09-30 ENCOUNTER — HEALTH MAINTENANCE LETTER (OUTPATIENT)
Age: 34
End: 2019-09-30

## 2021-01-15 ENCOUNTER — HEALTH MAINTENANCE LETTER (OUTPATIENT)
Age: 36
End: 2021-01-15

## 2021-05-30 ENCOUNTER — RECORDS - HEALTHEAST (OUTPATIENT)
Dept: ADMINISTRATIVE | Facility: CLINIC | Age: 36
End: 2021-05-30

## 2021-10-24 ENCOUNTER — HEALTH MAINTENANCE LETTER (OUTPATIENT)
Age: 36
End: 2021-10-24

## 2022-02-13 ENCOUNTER — HEALTH MAINTENANCE LETTER (OUTPATIENT)
Age: 37
End: 2022-02-13

## 2022-10-16 ENCOUNTER — HEALTH MAINTENANCE LETTER (OUTPATIENT)
Age: 37
End: 2022-10-16

## 2023-03-26 ENCOUNTER — HEALTH MAINTENANCE LETTER (OUTPATIENT)
Age: 38
End: 2023-03-26